# Patient Record
(demographics unavailable — no encounter records)

---

## 2017-02-17 NOTE — REPUSA
CT of the abdomen and pelvis without contrast

Clinical statement: Pain.

Technique: Multiple axial CT images were obtained from the base of the lungs to the floor of the pelv
is utilizing 5 mm axial slices without administration of contrast. Coronal and sagittal reconstructio
ns were also obtained.

No comparison is available.

Findings:

Chest: The visualized lung bases are clear.

Abdomen: The kidneys are normal in size bilaterally. There is moderate right-sided hydronephrosis cau
sed by a 4 mm obstructing stone at the right ureteropelvic junction. The liver, spleen, pancreas, gal
lbladder and adrenal glands are unremarkable. The aorta demonstrates normal caliber and contour. Ther
e is no abdominal lymphadenopathy or ascites.

Pelvis: The bowel is unremarkable, with no obstructive or inflammatory changes. The appendix is simone
l. The urinary bladder is within normal limits. There is no pelvic lymphadenopathy or ascites. The ot
her pelvic structures appear unremarkable.

Bones: There are no suspicious osseous abnormalities seen.

Impression: Mild right-sided hydronephrosis caused by a 4 mm obstructing stone at the right ureterope
lvic junction.

     Electronically signed by JODI CAMARILLO MD on 02/17/2017 10:05:26 PM ET

## 2017-02-17 NOTE — EDDOCDS
Nurse's Notes                                                                                     

[facility]                                                                         

Name: Marjorie Graves                                                                             

Age: 25 yrs                                                                                       

Sex: Female                                                                                       

: 1991                                                                                   

MRN: [id number]                                                                                     

Arrival Date: 2017                                                                          

Time: 19:40                                                                                       

Account#: [id number]                                                                              

Bed I5 / M5                                                                                       

Private MD: Libertad                                                                       

Diagnosis: Hydronephrosis with renal and ureteral calculous obstruction-right 4mm UPJ stone with  

mild hydronephrosis                                                                             

                                                                                                  

Presentation:                                                                                     

                                                                                             

19:50 Presenting complaint: Patient states: stomach pain since earlier today pain back side   cz  

      and worse now pt denies UTI symptoms. Acute neurological deficits are not present.          

      Mechanism of Injury: No Mechanism of Injury. Adult Sepsis Screening: The patient does       

      not have new or worsening altered mentation. Patient's respiratory rate is less than        

      22. Systolic blood pressure is greater than 100. Patient has a qSOFA score of 0-            

      Negative Sepsis Screen. Suicide/Homicide risk assessment- the patient denies having any     

      suicidal and/or homicidal ideations and does not present with any other emotional,          

      behavioral or mental health complaints.  Status: The patient is a           

      dependent. Transition of care: patient was not received from another setting of care.       

19:50 Acuity: TANG Level 3                                                                     cz  

19:50 Method Of Arrival: Walkin/Carried/Asstd                                                 cz  

                                                                                                  

Triage Assessment:                                                                                

19:52 General: Appears uncomfortable. Pain: Location: back Pain currently is 8 out of 10 on a cz  

      pain scale. Pt Declines HIV testing.                                                        

                                                                                                  

OB/GYN:                                                                                           

19:52 no periods due to birth control pill                                                    cz  

                                                                                                  

Historical:                                                                                       

- Allergies: No known drug Allergies;                                                             

- Home Meds:                                                                                      

1. birth control                                                                                

- PMHx: none;                                                                                     

- PSHx: ;                                                                                

- Social history: Smoking status: Patient states was never smoker of tobacco. No                  

barriers to communication noted, The patient speaks fluent English, Speaks                      

appropriately for age.                                                                          

- Family history: Not pertinent.                                                                  

- : The pt / caregiver states he / she is not on anticoagulants. Home medication list             

is obtained from the patient.                                                                   

- Exposure Risk Screening:: None identified.                                                      

                                                                                                  

                                                                                                  

Screenin:29 Screening information is obtained from the patient. Fall risk: No risks identified.     cjh 

      Assistance ADL's: requires no assistance with activities of daily living. Abuse/DV          

      Screen: The patient / caregiver reports he/she is: not in a situation that causes fear,     

      pain or injury. Nutritional screening: No deficits noted. Advance Directives: There is      

      no active DNR order. home support is adequate.                                              

                                                                                                  

Assessment:                                                                                       

20:29 General: Appears in no apparent distress, comfortable, Behavior is appropriate for age, cjh 

      cooperative. Pain: Location: posterior aspect of right lateral abdomen and anterior         

      aspect of right lateral abdomen Pain currently is 8 out of 10 on a pain scale.              

      Respiratory: Airway is patent Respiratory effort is even, unlabored, Respiratory            

      pattern is regular, symmetrical. GI: Abdomen is non- distended Bowel sounds present X 4     

      quads. Abd is soft and non tender X 4 quads. Musculoskeletal: Range of motion intact in     

      all extremities.                                                                            

21:10 General: Pt laying in bed talking on phone. States "I feel so much better". Bolus       ld5 

      started per orders. Will continue to monitor.                                               

21:53 General: returned from CT, tolerated well, awaiting results, friends at bedside, will   [facility] 

      continue to monitor.                                                                        

22:36 General: Pt ambulated to bathroom. Tolerated well. Reports minimal pain. Resting        ld5 

      comfortably in bed. Friends at bedside. Will continue to monitor.                           

23:05 General: Appears in no apparent distress, Behavior is cooperative. Pain: Pain currently ld5 

      is 2 out of 10 on a pain scale. Neurological: Level of Consciousness is awake, alert.       

      Respiratory: Airway is patent Respiratory effort is even, unlabored.                        

                                                                                                  

Vital Signs:                                                                                      

19:42  / 71; Pulse 72; Resp 18 S; Temp 97.9(O); Pulse Ox 99% on R/A; Weight 53.52 kg    gr2 

      (R); Height 4 ft. 11 in. (149.86 cm) (R); Pain 9/10;                                        

23:05  / 71; Pulse 74; Resp 16; Temp 97.8; Pulse Ox 100% on R/A; Pain 2/10;             ld5 

19:42 Body Mass Index 23.83 (53.52 kg, 149.86 cm)                                             gr2 

                                                                                                  

Vitals:                                                                                           

19:42 Log In Time: 2017 at 19:42.                                                gr2 

                                                                                                  

ED Course:                                                                                        

19:42 Patient visited by Kavon Scott.                                                   gr2 

19:42 Libertad is Private Physician.                                                   gr2 

19:42 Patient moved to Waiting                                                                gr2 

19:44 Patient visited by Kavon Scott.                                                   gr2 

19:44 Patient moved to Pre RCE                                                                gr2 

19:51 Triage Initiated                                                                        cz  

19:55 Reji Rudolph PA-C is PsychiatricP.                                                        ar2 

19:55 Patient moved to Triage 2                                                               cz  

19:56 David Rhoades DO is Attending Physician.                                            ar2 

19:56 Patient visited by Reji Rudolph PA-C.                                             ar2 

20:06 Patient moved to I5 / M5                                                                cz  

20:16 Patient visited by Joan Nichols PCA.                                               cln 

20:16 Urinalysis Sent.                                                                        cln 

20:16 Urine Culture Sent.                                                                     cln 

20:29 The patient / caregiver is instructed regarding the plan of care and ED course.         [facility] 

20:29 Inserted saline lock: 20 gauge in left antecubital area and blood collected. No         [facility] 

      procedures done that require assistance. Labs drawn. (by ED staff). Sent per order to       

      lab.                                                                                        

20:48 Patient visited by Itzel Mayen RN.                                                  ld5 

21:11 Patient visited by Itzel Mayen RN.                                                  ld5 

21:54 Patient visited by Anuradha Whipple RN.                                                      cjh 

22:12 CT ABD & PELVIS: No Contrast Returned.                                                  EDMS

22:25 [facility] Payment Agreement was scanned into [employer] and attached to record.               zo  

22:37 Patient visited by Itzel Mayen RN.                                                  ld5 

22:43 Jazmin Adkins MD is Referral Physician.                                              ar2 

23:05 Discontinued lock intact, bleeding controlled, pressure dressing applied, No            ld5 

      redness/swelling at site.                                                                   

23:07 Patient visited by Itzel Mayen RN.                                                  ld5 

                                                                                                  

Administered Medications:                                                                         

20:24 Drug: Ondansetron 4 mg [ondansetron HCl 2 mg/mL intravenous solution (2 mL)] Route:     ld5 

      IVP; Site: left antecubital;                                                                

23:05 Follow up: Response: Nausea is decreased                                                ld5 

20:48 Drug: ketorolac 30 mg [ketorolac 30 mg/mL (1 mL) injection solution (1 mL)] Route: IVP; ld5 

      Site: left antecubital;                                                                     

23:00 Follow up: Response: Med's dispensed home                                               ld5 

21:11 Drug: NS 0.9% 1000 ml [sodium chloride 0.9 % intravenous solution] Route: IV; Rate:     ld5 

      bolus; Site: left antecubital;                                                              

22:59 Follow up: IV Status: Completed infusion; IV Intake: 1000ml                             ld5 

22:59 Drug: HYDROcodone-acetaminophen 4 pack- 1 packets [hydrocodone 5 mg-acetaminophen 325   ld5 

      mg tablet (1 tabs)] {Co-Signature: [facility] (Anuradha Whipple RN).} Route: PO;                        

22:59 Follow up: Response: Med's dispensed home                                               ld5 

                                                                                                  

                                                                                                  

Point of Care Testing:                                                                            

      Urine Pregnancy:                                                                            

20:16 hCG Reading: Negative; Control Reading: Positive;                                       cln 

      Ranges:                                                                                     

                                                                                                  

Intake:                                                                                           

22:59 IV: 1000.00ml; Total: 1000.00ml.                                                        ld5 

                                                                                                  

Order Results:                                                                                    

Lab Order: Urinalysis; SPEC'M 17 19:58                                                      

      Test: APPEARANCE, URINE; Value: CLEAR; Range: CLEAR; Status: F                              

      Test: COLOR, URINE; Value: YELLOW; Range: YELLOW; Status: F                                 

      Test: PH,URINE; Value: 8.0; Range: 5.0-9.0; Units: UNITS; Status: F                         

      Test: SPECIFIC GRAVITY URINE AUTO; Value: 1.018; Range: 1.002-1.035; Status: F              

      Test: PROTEIN, URINE AUTO; Value: 1+; Range: NEGATIVE; Abnormal: Above high normal;         

      Units: mg/dL; Status: F                                                                     

      Test: GLUCOSE, URINE (UA) AUTO; Value: NEGATIVE; Range: NEGATIVE; Units: mg/dL; Status:     

      F                                                                                           

      Test: KETONE, URINE AUTO; Value: NEGATIVE; Range: NEGATIVE; Units: mg/dL; Status: F         

      Test: UROBILINOGEN, URINE AUTO; Value: 0.2; Range: 0.0-2.0; Units: mg/dL; Status: F         

      Test: BILIRUBIN, URINE AUTO; Value: NEGATIVE; Range: NEGATIVE; Status: F                    

      Test: NITRITE, URINE AUTO; Value: NEGATIVE; Range: NEGATIVE; Status: F                      

      Test: LEUKOCYTE ESTERASE, URINE AUTO; Value: TRACE; Range: NEGATIVE; Abnormal: Above        

      high normal; Status: F                                                                      

      Test: BLOOD, URINE BLOOD; Value: 3+; Range: NEGATIVE; Abnormal: Above high normal;          

      Status: F                                                                                   

      Test: WBC, URINE AUTO; Value: 13; Range: 0-3; Abnormal: Above high normal; Units: /HPF;     

      Status: F                                                                                   

      Test: RBC, URINE AUTO; Value: TNTC; Range: 0-3; Abnormal: Above high normal; Units:         

      /HPF; Status: F                                                                             

      Test: BACTERIA, URINE AUTO; Value: 1+; Range: NEGATIVE; Abnormal: Above high normal;        

      Status: F                                                                                   

      Test: SQUAMOUS EPITHELIAL CELL UR AU; Value: 1; Range: 0-6; Units: /HPF; Status: F          

      Test: MUCUS, URINE; Value: SMALL; Range: NEGATIVE; Status: F                                

      Test: HYALINE CAST, URINE AUTO; Value: 0; Range: 0-1; Units: /LPF; Status: F                

      Test: AMORPHOUS SEDIMENT; Value: SMALL; Range: NEGATIVE; Abnormal: Above high normal;       

      Status: F                                                                                   

Lab Order: Amylase; SPEC'M 17 20:19                                                         

      Test: AMYLASE; Value: 65; Range: ; Units: U/L; Status: F                              

Lab Order: Basic Metabolic Profile; SPEC' 17 20:19                                         

      Test: GLUCOSE, FASTING; Value: 87; Range: ; Units: MG/DL; Status: F                   

      Test: BLOOD UREA NITROGEN; Value: 8; Range: 7-18; Units: MG/DL; Status: F                   

      Test: CREATININE FOR GFR; Value: 0.92; Range: 0.55-1.02; Units: MG/DL; Status: F            

      Test: GLOMERULAR FILTRATION RATE; Value: > 60.0; Range: >60; Status: F                      

      Test: SODIUM LEVEL; Value: 142; Range: 136-145; Units: MEQ/L; Status: F                     

      Test: POTASSIUM SERUM; Value: 3.8; Range: 3.5-5.1; Units: MEQ/L; Status: F                  

      Test: CHLORIDE LEVEL; Value: 106; Range: ; Units: MEQ/L; Status: F                    

      Test: CARBON DIOXIDE LEVEL; Value: 28; Range: 21-32; Units: MEQ/L; Status: F                

      Test: ANION GAP; Value: 8; Range: 8-16; Units: MEQ/L; Status: F                             

      Test: CALCIUM LEVEL; Value: 9.0; Range: 8.5-10.1; Units: MG/DL; Status: F                   

      Test Note: &nbsp;; Units are mL/min/1.73 m2 Chronic Kidney Disease Staging per NKF:       

      Stage I & II GFR >=60 Normal to Mildly Decreased Stage III GFR 30-59 Moderately           

      Decreased Stage IV GFR 15-29 Severely Decreased Stage V GFR <15 Very Little GFR Left        

      ESRD GFR <15 on RRT                                                                         

Lab Order: CBC with Diff; SPEC'M 17 20:19                                                   

      Test: WHITE BLOOD COUNT; Value: 8.6; Range: 4.0-10.0; Units: K/mm3; Status: F               

      Test: RED BLOOD COUNT; Value: 4.67; Range: 4.00-5.40; Units: M/mm3; Status: F               

      Test: HEMOGLOBIN; Value: 13.8; Range: 12.0-16.0; Units: g/dl; Status: F                     

      Test: HEMATOCRIT; Value: 42.3; Range: 36.0-47.0; Units: %; Status: F                        

      Test: MEAN CORPUSCULAR VOLUME; Value: 90.5; Range: 80.0-96.0; Units: fl; Status: F          

      Test: MEAN CORPUSCULAR HEMOGLOBIN; Value: 29.6; Range: 27.0-33.0; Units: pg; Status: F      

      Test: MEAN CORPUSCULAR HGB CONC; Value: 32.8; Range: 32.0-36.5; Units: g/dl; Status: F      

      Test: RED CELL DISTRIBUTION WIDTH; Value: 13.2; Range: 11.5-14.5; Units: %; Status: F       

      Test: PLATELET COUNT, AUTOMATED; Value: 208; Range: 150-450; Units: k/mm3; Status: F        

      Test: NEUTROPHILS %; Value: 61.4; Range: 36.0-66.0; Units: %; Status: F                     

      Test: LYMPH %; Value: 28.8; Range: 24.0-44.0; Units: %; Status: F                           

      Test: MONO %; Value: 5.1; Range: 0.0-5.0; Abnormal: Above high normal; Units: %;            

      Status: F                                                                                   

      Test: EOS %; Value: 1.0; Range: 0.0-3.0; Units: %; Status: F                                

      Test: BASO %; Value: 0.5; Range: 0.0-1.0; Units: %; Status: F                               

      Test: LARGE UNSTAINED CELL %; Value: 3.0; Range: 0.0-4.0; Units: %; Status: F               

      Test: NEUTROPHILS #; Value: 5.3; Range: 1.8-7.7; Units: K/mm3; Status: F                    

      Test: LYMPH #; Value: 2.5; Range: 1.5-6.5; Units: K/mm3; Status: F                          

      Test: MONO #; Value: 0.4; Range: 0.0-0.8; Units: K/mm3; Status: F                           

      Test: EOS #; Value: 0.1; Range: 0.0-0.50; Units: K/mm3; Status: F                           

      Test: BASO #; Value: 0.0; Range: 0.0-0.2; Units: K/mm3; Status: F                           

      Test: LARGE UNSTAINED CELL #; Value: 0.3; Range: 0.0-0.4; Units: K/mm3; Status: F           

Lab Order: Lipase; SPEC' 17 20:19                                                          

      Test: LIPASE; Value: 96; Range: ; Units: U/L; Status: F                               

Lab Order: Liver Profile; SPEC' 17 20:19                                                   

      Test: AST/SGOT; Value: 15; Range: 15-37; Units: U/L; Status: F                              

      Test: ALT/SGPT; Value: 20; Range: 12-78; Units: U/L; Status: F                              

      Test: ALKALINE PHOSPHATASE; Value: 66; Range: ; Units: U/L; Status: F                 

      Test: BILIRUBIN,TOTAL; Value: 0.2; Range: 0.2-1.0; Units: MG/DL; Status: F                  

      Test: BILIRUBIN,DIRECT; Value: < 0.1; Range: 0.0-0.2; Units: MG/DL; Status: F               

      Test: TOTAL PROTEIN; Value: 6.8; Range: 6.4-8.2; Units: GM/DL; Status: F                    

      Test: ALBUMIN; Value: 3.5; Range: 3.2-5.2; Units: GM/DL; Status: F                          

      Test: ALBUMIN/GLOBULIN RATIO; Value: 1.06; Range: 1.00-1.93; Status: F                      

                                                                                                  

Radiology Order: CT ABD & PELVIS: No Contrast                                                   

      Test: CT ABD & PELVIS: No Contrast                                                        

      REASON FOR EXAMINATION: right renal colic; ; CT of the abdomen and pelvis without           

      contrast; Clinical statement: Pain.; Technique: Multiple axial CT images were obtained      

      from the base of the lungs to the floor of the pelv; is utilizing 5 mm axial slices         

      without administration of contrast. Coronal and sagittal reconstructio; ns were also        

      obtained.; No comparison is available.; Findings:; Chest: The visualized lung bases are     

      clear.; Abdomen: The kidneys are normal in size bilaterally. There is moderate              

      right-sided hydronephrosis cau; sed by a 4 mm obstructing stone at the right                

      ureteropelvic junction. The liver, spleen, pancreas, gal; lbladder and adrenal glands       

      are unremarkable. The aorta demonstrates normal caliber and contour. Ther; e is no          

      abdominal lymphadenopathy or ascites.; Pelvis: The bowel is unremarkable, with no           

      obstructive or inflammatory changes. The appendix is simone; l. The urinary bladder is       

      within normal limits. There is no pelvic lymphadenopathy or ascites. The ot; her pelvic     

      structures appear unremarkable.; Bones: There are no suspicious osseous abnormalities       

      seen.; Impression: Mild right-sided hydronephrosis caused by a 4 mm obstructing stone       

      at the right ureterope; lvic junction.; Electronically signed by JODI CAMARILLO MD on     

      2017 10:05:26 PM ET;                                                                  

Outcome:                                                                                          

22:44 Discharge ordered by Provider.                                                          ar2 

23:05 Discharge Assessment: Patient awake, alert and oriented x 3. No cognitive and/or        ld5 

      functional deficits noted. Patient verbalized understanding of disposition                  

      instructions. patient administered narcotics - no. The following High Risk Discharge        

      criteria are identified: None. Discharged to home ambulatory, with friend. Condition:       

      stable. Discharge instructions given to patient, Instructed on discharge instructions,      

      follow up and referral plans. medication usage, no driving heavy equipment,                 

      Demonstrated understanding of instructions, medications, Pt was receptive of discharge      

      instructions/ teaching. Prescriptions given X 4. CT Study completed. Property :Personal     

      belongings accompany Pt.                                                                    

23:07 Patient left the ED.                                                                    ld5 

                                                                                                  

Signatures:                                                                                       

Dispatcher [employer]                           EDMS                                                 

Juan C Carr, Alvino Smalls RN, Aaron, MIA BELLAMY ar2                                                  

Itzel Mayen RN RN ld5                                                  

Anuradha Whipple,FANTASMA                          RN   [facility]                                                  

Kavon Scott                            gr2                                                  

Joan Nichols, PCA                   PCA  cln                                                  

Anuradha Whipple RN                               [facility]                                                  

                                                                                                  

**************************************************************************************************

MTDD

## 2017-02-17 NOTE — EDDOCDS
Physician Documentation                                                                           

[facility]                                                                         

Name: Marjorie Graves                                                                             

Age: 25 yrs                                                                                       

Sex: Female                                                                                       

: 1991                                                                                   

MRN: [id number]                                                                                     

Arrival Date: 2017                                                                          

Time: 19:40                                                                                       

Account#: [id number]                                                                              

Bed I5 / M5                                                                                       

Private MD: Libertad                                                                       

Disposition:                                                                                      

17 22:44 Discharged to Home/Self Care. Impression: Hydronephrosis with renal and            

ureteral calculous obstruction - right 4mm UPJ stone with mild hydronephrosis.                  

- Condition is Stable.                                                                            

- Discharge Instructions: Kidney Stones.                                                          

- Prescriptions for Ibuprofen 800 mg Oral Tablet - take 1 tablet by ORAL route every 8            

hours As needed take with food; 30 tablet. Norco 5- 325 mg Oral Tablet - take 1                 

tablet by ORAL route every 6 hours As needed MDD: 4 tabs; 16 tablet. Flomax 0.4 mg              

Oral Capsule, Sust. Release 24 hr - take 1 capsule by ORAL route once daily 1/2 hour            

following the same meal each day; 15 capsule. ZOFRAN ODT 4 mg Oral - dissolve 1                 

tablet by ORAL route every 8 hours As needed do not chew, do not swallow whole; 10              

tablet.                                                                                         

- Medication Reconciliation, Local Pharmacy Hours form.                                           

- Follow up: Jazmin Adkins MD; When: Call to arrange an appointment; Reason: Recheck           

today's complaints, Continuance of care. Follow up: Emergency Department; When: As              

needed; Reason: Worsening of conditions.                                                        

- Problem is new.                                                                                 

- Symptoms have improved.                                                                         

- Notes: call monday for follow up appointment. drink plenty of fluids                            

                                                                                                  

                                                                                                  

Historical:                                                                                       

- Allergies: No known drug Allergies;                                                             

- Home Meds:                                                                                      

1. birth control                                                                                

- PMHx: none;                                                                                     

- PSHx: ;                                                                                

- Social history: Smoking status: Patient states was never smoker of tobacco. No                  

barriers to communication noted, The patient speaks fluent English, Speaks                      

appropriately for age.                                                                          

- Family history: Not pertinent.                                                                  

- : The pt / caregiver states he / she is not on anticoagulants. Home medication list             

is obtained from the patient.                                                                   

- Exposure Risk Screening:: None identified.                                                      

                                                                                                  

                                                                                                  

OB/GYN:                                                                                           

                                                                                             

19:52 no periods due to birth control pill                                                    cz  

                                                                                                  

Vital Signs:                                                                                      

19:42  / 71; Pulse 72; Resp 18 S; Temp 97.9(O); Pulse Ox 99% on R/A; Weight 53.52 kg /  gr2 

      117.99 lbs (R); Height 4 ft. 11 in. (149.86 cm) (R); Pain 9/10;                             

23:05  / 71; Pulse 74; Resp 16; Temp 97.8; Pulse Ox 100% on R/A; Pain 2/10;             ld5 

19:42 Body Mass Index 23.83 (53.52 kg, 149.86 cm)                                             gr2 

                                                                                                  

MDM:                                                                                              

19:57 UCG by Nursing ordered.                                                                 cz  

19:58 Urinalysis Ordered.                                                                     EDMS

19:58 Urine Culture Ordered.                                                                  EDMS

20:03 Undress patient appropriately for examination ordered.                                  ar2 

20:03 IV Saline Lock ordered.                                                                 ar2 

20:03 Ondansetron 4 mg IVP once ordered.                                                      ar2 

20:05 Amylase Ordered.                                                                        EDMS

20:05 Basic Metabolic Profile Ordered.                                                        EDMS

20:05 CBC with Diff Ordered.                                                                  EDMS

20:05 Lipase Ordered.                                                                         EDMS

20:05 Liver Profile Ordered.                                                                  EDMS

20:06 NOTHING BY MOUTH+DIET ordered.                                                          EDMS

20:42 ketorolac 30 mg IVP once ordered.                                                       ar2 

20:52 Urinalysis Reviewed.                                                                    ar2 

20:52 CBC with Diff Reviewed.                                                                 ar2 

20:52 Amylase Reviewed.                                                                       ar2 

20:52 Basic Metabolic Profile Reviewed.                                                       ar2 

20:52 Lipase Reviewed.                                                                        ar2 

20:52 Liver Profile Reviewed.                                                                 ar2 

20:53 NS 0.9% 1000 ml IV at bolus once ordered.                                               ar2 

20:53 CT ABD & PELVIS: No Contrast Ordered.                                                   EDMS

21:48 Financial registration complete.                                                        zo  

22:25 [facility] Payment Agreement was scanned into [employer] and attached to record.               zo  

22:42 Dispense Urine Strainer ordered.                                                        ar2 

22:47 HYDROcodone-acetaminophen 4 pack- 5 mg-325 mg 1 packets PO Per package directions;      ar2 

      Dispense with patient. 1 po q4h prn for pain ordered.                                       

22:48 CT ABD & PELVIS: No Contrast Reviewed.                                                  ar2

                                                                                                  

Point of Care Testing:                                                                            

      Urine Pregnancy:                                                                            

20:16 hCG Reading: Negative; Control Reading: Positive;                                       cln 

      Ranges:                                                                                     

                                                                                                  

Administered Medications:                                                                         

20:24 Drug: Ondansetron 4 mg [ondansetron HCl 2 mg/mL intravenous solution (2 mL)] Route:     ld5 

      IVP; Site: left antecubital;                                                                

23:05 Follow up: Response: Nausea is decreased                                                ld5 

20:48 Drug: ketorolac 30 mg [ketorolac 30 mg/mL (1 mL) injection solution (1 mL)] Route: IVP; ld5 

      Site: left antecubital;                                                                     

23:00 Follow up: Response: Med's dispensed home                                               ld5 

21:11 Drug: NS 0.9% 1000 ml [sodium chloride 0.9 % intravenous solution] Route: IV; Rate:     ld5 

      bolus; Site: left antecubital;                                                              

22:59 Follow up: IV Status: Completed infusion; IV Intake: 1000ml                             ld5 

22:59 Drug: HYDROcodone-acetaminophen 4 pack- 1 packets [hydrocodone 5 mg-acetaminophen 325   ld5 

      mg tablet (1 tabs)] {Co-Signature: [facility] (Anuradha Whipple RN).} Route: PO;                        

22:59 Follow up: Response: Med's dispensed home                                               ld5 

                                                                                                  

                                                                                                  

Signatures:                                                                                       

Dispatcher [employer]                           Juan C Arteaga RN RN cz Olin, Zoeann zo Robertshaw, Aaron, PA-C                 [facility] ar2                                                  

Itzel Mayen RN RN ld5 Hafner, Jane, RN RN   [facility]                                                  

Anuradha Whipple RN                               [facility]                                                  

                                                                                                  

The chart was reviewed and I authenticate all verbal orders and agree with the evaluation and 
treatment provided.Attachments:

22:25 [facility] Payment Agreement                                                                zo  

                                                                                                  

**************************************************************************************************

MTDD

## 2017-02-20 NOTE — EDDOCDS
Nurse's Notes                                                                                     

[facility]                                                                         

Name: Marjorie Graves                                                                             

Age: 25 yrs                                                                                       

Sex: Female                                                                                       

: 1991                                                                                   

MRN: [id number]                                                                                     

Arrival Date: 2017                                                                          

Time: 19:40                                                                                       

Account#: [id number]                                                                              

Bed I5 / M5                                                                                       

Private MD: Libertad                                                                       

Diagnosis: Hydronephrosis with renal and ureteral calculous obstruction-right 4mm UPJ stone with  

mild hydronephrosis                                                                             

                                                                                                  

Presentation:                                                                                     

                                                                                             

19:50 Presenting complaint: Patient states: stomach pain since earlier today pain back side   cz  

      and worse now pt denies UTI symptoms. Acute neurological deficits are not present.          

      Mechanism of Injury: No Mechanism of Injury. Adult Sepsis Screening: The patient does       

      not have new or worsening altered mentation. Patient's respiratory rate is less than        

      22. Systolic blood pressure is greater than 100. Patient has a qSOFA score of 0-            

      Negative Sepsis Screen. Suicide/Homicide risk assessment- the patient denies having any     

      suicidal and/or homicidal ideations and does not present with any other emotional,          

      behavioral or mental health complaints.  Status: The patient is a           

      dependent. Transition of care: patient was not received from another setting of care.       

19:50 Acuity: TANG Level 3                                                                     cz  

19:50 Method Of Arrival: Walkin/Carried/Asstd                                                 cz  

                                                                                                  

Triage Assessment:                                                                                

19:52 General: Appears uncomfortable. Pain: Location: back Pain currently is 8 out of 10 on a cz  

      pain scale. Pt Declines HIV testing.                                                        

                                                                                                  

OB/GYN:                                                                                           

19:52 no periods due to birth control pill                                                    cz  

                                                                                                  

Historical:                                                                                       

- Allergies: No known drug Allergies;                                                             

- Home Meds:                                                                                      

1. birth control                                                                                

- PMHx: none;                                                                                     

- PSHx: ;                                                                                

- Social history: Smoking status: Patient states was never smoker of tobacco. No                  

barriers to communication noted, The patient speaks fluent English, Speaks                      

appropriately for age.                                                                          

- Family history: Not pertinent.                                                                  

- : The pt / caregiver states he / she is not on anticoagulants. Home medication list             

is obtained from the patient.                                                                   

- Exposure Risk Screening:: None identified.                                                      

                                                                                                  

                                                                                                  

Screenin:29 Screening information is obtained from the patient. Fall risk: No risks identified.     cjh 

      Assistance ADL's: requires no assistance with activities of daily living. Abuse/DV          

      Screen: The patient / caregiver reports he/she is: not in a situation that causes fear,     

      pain or injury. Nutritional screening: No deficits noted. Advance Directives: There is      

      no active DNR order. home support is adequate.                                              

                                                                                                  

Assessment:                                                                                       

20:29 General: Appears in no apparent distress, comfortable, Behavior is appropriate for age, cjh 

      cooperative. Pain: Location: posterior aspect of right lateral abdomen and anterior         

      aspect of right lateral abdomen Pain currently is 8 out of 10 on a pain scale.              

      Respiratory: Airway is patent Respiratory effort is even, unlabored, Respiratory            

      pattern is regular, symmetrical. GI: Abdomen is non- distended Bowel sounds present X 4     

      quads. Abd is soft and non tender X 4 quads. Musculoskeletal: Range of motion intact in     

      all extremities.                                                                            

21:10 General: Pt laying in bed talking on phone. States "I feel so much better". Bolus       ld5 

      started per orders. Will continue to monitor.                                               

21:53 General: returned from CT, tolerated well, awaiting results, friends at bedside, will   [facility] 

      continue to monitor.                                                                        

22:36 General: Pt ambulated to bathroom. Tolerated well. Reports minimal pain. Resting        ld5 

      comfortably in bed. Friends at bedside. Will continue to monitor.                           

23:05 General: Appears in no apparent distress, Behavior is cooperative. Pain: Pain currently ld5 

      is 2 out of 10 on a pain scale. Neurological: Level of Consciousness is awake, alert.       

      Respiratory: Airway is patent Respiratory effort is even, unlabored.                        

                                                                                                  

Vital Signs:                                                                                      

19:42  / 71; Pulse 72; Resp 18 S; Temp 97.9(O); Pulse Ox 99% on R/A; Weight 53.52 kg    gr2 

      (R); Height 4 ft. 11 in. (149.86 cm) (R); Pain 9/10;                                        

23:05  / 71; Pulse 74; Resp 16; Temp 97.8; Pulse Ox 100% on R/A; Pain 2/10;             ld5 

19:42 Body Mass Index 23.83 (53.52 kg, 149.86 cm)                                             gr2 

                                                                                                  

Vitals:                                                                                           

19:42 Log In Time: 2017 at 19:42.                                                gr2 

                                                                                                  

ED Course:                                                                                        

19:42 Patient visited by Kavon Scott.                                                   gr2 

19:42 Libertad is Private Physician.                                                   gr2 

19:42 Patient moved to Waiting                                                                gr2 

19:44 Patient visited by Kavon Scott.                                                   gr2 

19:44 Patient moved to Pre RCE                                                                gr2 

19:51 Triage Initiated                                                                        cz  

19:55 [facility]                                                        ar2 

19:55 Patient moved to Triage 2                                                               cz  

19:56 David Rhoades DO is Attending Physician.                                            ar2 

19:56 Patient visited by Reji Rudolph PA-C.                                             ar2 

20:06 Patient moved to I5 / M5                                                                cz  

20:16 Patient visited by Joan Nichols PCA.                                               cln 

20:16 Urinalysis Sent.                                                                        cln 

20:16 Urine Culture Sent.                                                                     cln 

20:29 The patient / caregiver is instructed regarding the plan of care and ED course.         [facility] 

20:29 Inserted saline lock: 20 gauge in left antecubital area and blood collected. No         [facility] 

      procedures done that require assistance. Labs drawn. (by ED staff). Sent per order to       

      lab.                                                                                        

20:48 Patient visited by Itzel Mayen RN.                                                  ld5 

21:11 Patient visited by Itzel Mayen RN.                                                  ld5 

21:54 Patient visited by Anuradha Whipple RN.                                                      cjh 

22:12 CT ABD & PELVIS: No Contrast Returned.                                                  EDMS

22:25 [facility] Payment Agreement was scanned into [employer] and attached to record.               zo  

22:37 Patient visited by Itzel Mayen RN.                                                  ld5 

22:43 Jazmin Adkins MD is Referral Physician.                                              ar2 

23:05 Discontinued lock intact, bleeding controlled, pressure dressing applied, No            ld5 

      redness/swelling at site.                                                                   

23:07 Patient visited by Itzel Mayen RN.                                                  ld5 

                                                                                             

11:30 T-Sheet-- Draft Copy was scanned into [employer] and attached to record.                   gb  

11:30 Radiology Report was scanned into [employer] and attached to record.                       gb  

                                                                                                  

Administered Medications:                                                                         

                                                                                             

20:24 Drug: Ondansetron 4 mg [ondansetron HCl 2 mg/mL intravenous solution (2 mL)] Route:     ld5 

      IVP; Site: left antecubital;                                                                

23:05 Follow up: Response: Nausea is decreased                                                ld5 

20:48 Drug: ketorolac 30 mg [ketorolac 30 mg/mL (1 mL) injection solution (1 mL)] Route: IVP; ld5 

      Site: left antecubital;                                                                     

23:00 Follow up: Response: Med's dispensed home                                               ld5 

21:11 Drug: NS 0.9% 1000 ml [sodium chloride 0.9 % intravenous solution] Route: IV; Rate:     ld5 

      bolus; Site: left antecubital;                                                              

22:59 Follow up: IV Status: Completed infusion; IV Intake: 1000ml                             ld5 

22:59 Drug: HYDROcodone-acetaminophen 4 pack- 1 packets [hydrocodone 5 mg-acetaminophen 325   ld5 

      mg tablet (1 tabs)] {Co-Signature: [facility] (Anuradha Whipple RN).} Route: PO;                        

22:59 Follow up: Response: Med's dispensed home                                               ld5 

                                                                                                  

                                                                                                  

Point of Care Testing:                                                                            

      Urine Pregnancy:                                                                            

20:16 hCG Reading: Negative; Control Reading: Positive;                                       cln 

      Ranges:                                                                                     

                                                                                                  

Intake:                                                                                           

22:59 IV: 1000.00ml; Total: 1000.00ml.                                                        ld5 

                                                                                                  

Order Results:                                                                                    

Lab Order: Urine Culture; SPEC'M 17 19:58                                                   

      Test: URINE CULTURE; Value: <EXTERNAL COMMENT eCWMed> FULL REPORT IN LAB NOTES (eCW and     

      Medent).; Status: F                                                                         

      Test: URINE CULTURE; Value: ORGANISM 1: PROTEUS MIRABILIS; Status: F                        

      Test: URINE CULTURE; Value: PROTEUS MIRABILIS; Status: F                                    

      Test: URINE CULTURE; Value: COLONY COUNT CFU/ml >100,000; Status: F                         

      Test: URINE CULTURE; Value: GRAM NEG SENSI - VITEK 80; Status: F                            

      Test: URINE CULTURE; Value: Method: VIT2; Status: F                                         

      Test: URINE CULTURE; Value: TRIMETHOPRIM/SULFAMETHOXAZOLE >=320 R; Status: F                

      Test: URINE CULTURE; Value: AMPICILLIN <=2 S; Status: F                                     

      Test: URINE CULTURE; Value: GENTAMICIN <=1 S; Status: F                                     

      Test: URINE CULTURE; Value: NITROFURANTOIN 128 R; Status: F                                 

      Test: URINE CULTURE; Value: CEFAZOLIN <=4 S; Status: F                                      

      Test: URINE CULTURE; Value: LEVOFLOXACIN <=0.12 S; Status: F                                

      Test: URINE CULTURE; Value: TOBRAMYCIN <=1 S; Status: F                                     

      Test: URINE CULTURE; Value: CEFTRIAXONE <=1 S; Status: F                                    

      Test: URINE CULTURE; Value: CEFTAZIDIME <=1 S; Status: F                                    

      Test: URINE CULTURE; Value: AMPICILLIN/SULBACTAM <=2 S; Status: F                           

      Test: URINE CULTURE; Value: PIPERACILLIN/TAZOBACTAM <=4 S; Status: F                        

      Test: URINE CULTURE; Value: AZTREONAM <=1 S; Status: F                                      

      Test: URINE CULTURE; Value: ERTAPENEM <=0.5 S; Status: F                                    

      Test: URINE CULTURE; Value: MEROPENEM <=0.25 S; Status: F                                   

      Test: URINE CULTURE; Value: TIGECYCLINE 4 R; Status: F                                      

      Test: URINE CULTURE; Value: CEFEPIME <=1 S; Status: F                                       

Lab Order: Urinalysis; SPEC'M 17 19:58                                                      

      Test: APPEARANCE, URINE; Value: CLEAR; Range: CLEAR; Status: F                              

      Test: COLOR, URINE; Value: YELLOW; Range: YELLOW; Status: F                                 

      Test: PH,URINE; Value: 8.0; Range: 5.0-9.0; Units: UNITS; Status: F                         

      Test: SPECIFIC GRAVITY URINE AUTO; Value: 1.018; Range: 1.002-1.035; Status: F              

      Test: PROTEIN, URINE AUTO; Value: 1+; Range: NEGATIVE; Abnormal: Above high normal;         

      Units: mg/dL; Status: F                                                                     

      Test: GLUCOSE, URINE (UA) AUTO; Value: NEGATIVE; Range: NEGATIVE; Units: mg/dL; Status:     

      F                                                                                           

      Test: KETONE, URINE AUTO; Value: NEGATIVE; Range: NEGATIVE; Units: mg/dL; Status: F         

      Test: UROBILINOGEN, URINE AUTO; Value: 0.2; Range: 0.0-2.0; Units: mg/dL; Status: F         

      Test: BILIRUBIN, URINE AUTO; Value: NEGATIVE; Range: NEGATIVE; Status: F                    

      Test: NITRITE, URINE AUTO; Value: NEGATIVE; Range: NEGATIVE; Status: F                      

      Test: LEUKOCYTE ESTERASE, URINE AUTO; Value: TRACE; Range: NEGATIVE; Abnormal: Above        

      high normal; Status: F                                                                      

      Test: BLOOD, URINE BLOOD; Value: 3+; Range: NEGATIVE; Abnormal: Above high normal;          

      Status: F                                                                                   

      Test: WBC, URINE AUTO; Value: 13; Range: 0-3; Abnormal: Above high normal; Units: /HPF;     

      Status: F                                                                                   

      Test: RBC, URINE AUTO; Value: TNTC; Range: 0-3; Abnormal: Above high normal; Units:         

      /HPF; Status: F                                                                             

      Test: BACTERIA, URINE AUTO; Value: 1+; Range: NEGATIVE; Abnormal: Above high normal;        

      Status: F                                                                                   

      Test: SQUAMOUS EPITHELIAL CELL UR AU; Value: 1; Range: 0-6; Units: /HPF; Status: F          

      Test: MUCUS, URINE; Value: SMALL; Range: NEGATIVE; Status: F                                

      Test: HYALINE CAST, URINE AUTO; Value: 0; Range: 0-1; Units: /LPF; Status: F                

      Test: AMORPHOUS SEDIMENT; Value: SMALL; Range: NEGATIVE; Abnormal: Above high normal;       

      Status: F                                                                                   

Lab Order: Amylase; SPEC'17 20:19                                                         

      Test: AMYLASE; Value: 65; Range: ; Units: U/L; Status: F                              

Lab Order: Basic Metabolic Profile; SPEC'17 20:19                                         

      Test: GLUCOSE, FASTING; Value: 87; Range: ; Units: MG/DL; Status: F                   

      Test: BLOOD UREA NITROGEN; Value: 8; Range: 7-18; Units: MG/DL; Status: F                   

      Test: CREATININE FOR GFR; Value: 0.92; Range: 0.55-1.02; Units: MG/DL; Status: F            

      Test: GLOMERULAR FILTRATION RATE; Value: > 60.0; Range: >60; Status: F                      

      Test: SODIUM LEVEL; Value: 142; Range: 136-145; Units: MEQ/L; Status: F                     

      Test: POTASSIUM SERUM; Value: 3.8; Range: 3.5-5.1; Units: MEQ/L; Status: F                  

      Test: CHLORIDE LEVEL; Value: 106; Range: ; Units: MEQ/L; Status: F                    

      Test: CARBON DIOXIDE LEVEL; Value: 28; Range: 21-32; Units: MEQ/L; Status: F                

      Test: ANION GAP; Value: 8; Range: 8-16; Units: MEQ/L; Status: F                             

      Test: CALCIUM LEVEL; Value: 9.0; Range: 8.5-10.1; Units: MG/DL; Status: F                   

      Test Note: &nbsp;; Units are mL/min/1.73 m2 Chronic Kidney Disease Staging per NKF:       

      Stage I & II GFR >=60 Normal to Mildly Decreased Stage III GFR 30-59 Moderately           

      Decreased Stage IV GFR 15-29 Severely Decreased Stage V GFR <15 Very Little GFR Left        

      ESRD GFR <15 on RRT                                                                         

Lab Order: CBC with Diff; SPEC'17 20:19                                                   

      Test: WHITE BLOOD COUNT; Value: 8.6; Range: 4.0-10.0; Units: K/mm3; Status: F               

      Test: RED BLOOD COUNT; Value: 4.67; Range: 4.00-5.40; Units: M/mm3; Status: F               

      Test: HEMOGLOBIN; Value: 13.8; Range: 12.0-16.0; Units: g/dl; Status: F                     

      Test: HEMATOCRIT; Value: 42.3; Range: 36.0-47.0; Units: %; Status: F                        

      Test: MEAN CORPUSCULAR VOLUME; Value: 90.5; Range: 80.0-96.0; Units: fl; Status: F          

      Test: MEAN CORPUSCULAR HEMOGLOBIN; Value: 29.6; Range: 27.0-33.0; Units: pg; Status: F      

      Test: MEAN CORPUSCULAR HGB CONC; Value: 32.8; Range: 32.0-36.5; Units: g/dl; Status: F      

      Test: RED CELL DISTRIBUTION WIDTH; Value: 13.2; Range: 11.5-14.5; Units: %; Status: F       

      Test: PLATELET COUNT, AUTOMATED; Value: 208; Range: 150-450; Units: k/mm3; Status: F        

      Test: NEUTROPHILS %; Value: 61.4; Range: 36.0-66.0; Units: %; Status: F                     

      Test: LYMPH %; Value: 28.8; Range: 24.0-44.0; Units: %; Status: F                           

      Test: MONO %; Value: 5.1; Range: 0.0-5.0; Abnormal: Above high normal; Units: %;            

      Status: F                                                                                   

      Test: EOS %; Value: 1.0; Range: 0.0-3.0; Units: %; Status: F                                

      Test: BASO %; Value: 0.5; Range: 0.0-1.0; Units: %; Status: F                               

      Test: LARGE UNSTAINED CELL %; Value: 3.0; Range: 0.0-4.0; Units: %; Status: F               

      Test: NEUTROPHILS #; Value: 5.3; Range: 1.8-7.7; Units: K/mm3; Status: F                    

      Test: LYMPH #; Value: 2.5; Range: 1.5-6.5; Units: K/mm3; Status: F                          

      Test: MONO #; Value: 0.4; Range: 0.0-0.8; Units: K/mm3; Status: F                           

      Test: EOS #; Value: 0.1; Range: 0.0-0.50; Units: K/mm3; Status: F                           

      Test: BASO #; Value: 0.0; Range: 0.0-0.2; Units: K/mm3; Status: F                           

      Test: LARGE UNSTAINED CELL #; Value: 0.3; Range: 0.0-0.4; Units: K/mm3; Status: F           

Lab Order: Lipase; SPEC' 17 20:19                                                          

      Test: LIPASE; Value: 96; Range: ; Units: U/L; Status: F                               

Lab Order: Liver Profile; SPEC' 17 20:19                                                   

      Test: AST/SGOT; Value: 15; Range: 15-37; Units: U/L; Status: F                              

      Test: ALT/SGPT; Value: 20; Range: 12-78; Units: U/L; Status: F                              

      Test: ALKALINE PHOSPHATASE; Value: 66; Range: ; Units: U/L; Status: F                 

      Test: BILIRUBIN,TOTAL; Value: 0.2; Range: 0.2-1.0; Units: MG/DL; Status: F                  

      Test: BILIRUBIN,DIRECT; Value: < 0.1; Range: 0.0-0.2; Units: MG/DL; Status: F               

      Test: TOTAL PROTEIN; Value: 6.8; Range: 6.4-8.2; Units: GM/DL; Status: F                    

      Test: ALBUMIN; Value: 3.5; Range: 3.2-5.2; Units: GM/DL; Status: F                          

      Test: ALBUMIN/GLOBULIN RATIO; Value: 1.06; Range: 1.00-1.93; Status: F                      

                                                                                                  

Radiology Order: CT ABD & PELVIS: No Contrast                                                   

      Test: CT ABD & PELVIS: No Contrast                                                        

      REASON FOR EXAMINATION: right renal colic; ; CT of the abdomen and pelvis without           

      contrast; Clinical statement: Pain.; Technique: Multiple axial CT images were obtained      

      from the base of the lungs to the floor of the pelv; is utilizing 5 mm axial slices         

      without administration of contrast. Coronal and sagittal reconstructio; ns were also        

      obtained.; No comparison is available.; Findings:; Chest: The visualized lung bases are     

      clear.; Abdomen: The kidneys are normal in size bilaterally. There is moderate              

      right-sided hydronephrosis cau; sed by a 4 mm obstructing stone at the right                

      ureteropelvic junction. The liver, spleen, pancreas, gal; lbladder and adrenal glands       

      are unremarkable. The aorta demonstrates normal caliber and contour. Ther; e is no          

      abdominal lymphadenopathy or ascites.; Pelvis: The bowel is unremarkable, with no           

      obstructive or inflammatory changes. The appendix is simone; l. The urinary bladder is       

      within normal limits. There is no pelvic lymphadenopathy or ascites. The ot; her pelvic     

      structures appear unremarkable.; Bones: There are no suspicious osseous abnormalities       

      seen.; Impression: Mild right-sided hydronephrosis caused by a 4 mm obstructing stone       

      at the right ureterope; lvic junction.; Electronically signed by JODI CAMARILLO MD on     

      2017 10:05:26 PM ET;                                                                  

Outcome:                                                                                          

22:44 Discharge ordered by Provider.                                                          ar2 

23:05 Discharge Assessment: Patient awake, alert and oriented x 3. No cognitive and/or        ld5 

      functional deficits noted. Patient verbalized understanding of disposition                  

      instructions. patient administered narcotics - no. The following High Risk Discharge        

      criteria are identified: None. Discharged to home ambulatory, with friend. Condition:       

      stable. Discharge instructions given to patient, Instructed on discharge instructions,      

      follow up and referral plans. medication usage, no driving heavy equipment,                 

      Demonstrated understanding of instructions, medications, Pt was receptive of discharge      

      instructions/ teaching. Prescriptions given X 4. CT Study completed. Property :Personal     

      belongings accompany Pt.                                                                    

23:07 Patient left the ED.                                                                    ld5 

                                                                                                  

Signatures:                                                                                       

Dispatcher [employer]                           EDMS                                                 

Juan C Carr, RN                      RN   cz                                                   

Shahla Qureshi, Reg                  Reg  Alvino Frerara Aaron, PA-C                 PA-C ar2                                                  

Itzel Mayen RN RN   ld5                                                  

Anuradha Whipple RN RN   [facility]                                                  

Kavon Scott                            gr2                                                  

Joan Nichols, PCA                   PCA  cln                                                  

Anuradha Whipple RN                               [facility]                                                  

                                                                                                  

**************************************************************************************************



*** Chart Complete ***
MTDD

## 2017-02-20 NOTE — EDDOCDS
Physician Documentation                                                                           

[facility]                                                                         

Name: Marjorie Graves                                                                             

Age: 25 yrs                                                                                       

Sex: Female                                                                                       

: 1991                                                                                   

MRN: [id number]                                                                                     

Arrival Date: 2017                                                                          

Time: 19:40                                                                                       

Account#: [id number]                                                                              

Bed I5 / M5                                                                                       

Private MD: [facility]                                                                       

Disposition:                                                                                      

17 22:44 Discharged to Home/Self Care. Impression: Hydronephrosis with renal and            

ureteral calculous obstruction - right 4mm UPJ stone with mild hydronephrosis.                  

- Condition is Stable.                                                                            

- Discharge Instructions: Kidney Stones.                                                          

- Prescriptions for Ibuprofen 800 mg Oral Tablet - take 1 tablet by ORAL route every 8            

hours As needed take with food; 30 tablet. Norco 5- 325 mg Oral Tablet - take 1                 

tablet by ORAL route every 6 hours As needed MDD: 4 tabs; 16 tablet. Flomax 0.4 mg              

Oral Capsule, Sust. Release 24 hr - take 1 capsule by ORAL route once daily 1/2 hour            

following the same meal each day; 15 capsule. ZOFRAN ODT 4 mg Oral - dissolve 1                 

tablet by ORAL route every 8 hours As needed do not chew, do not swallow whole; 10              

tablet.                                                                                         

- Medication Reconciliation, Local Pharmacy Hours form.                                           

- Follow up: Jazmin Adkins MD; When: Call to arrange an appointment; Reason: Recheck           

today's complaints, Continuance of care. Follow up: Emergency Department; When: As              

needed; Reason: Worsening of conditions.                                                        

- Problem is new.                                                                                 

- Symptoms have improved.                                                                         

- Notes: call monday for follow up appointment. drink plenty of fluids                            

                                                                                                  

                                                                                                  

Historical:                                                                                       

- Allergies: No known drug Allergies;                                                             

- Home Meds:                                                                                      

1. birth control                                                                                

- PMHx: none;                                                                                     

- PSHx: ;                                                                                

- Social history: Smoking status: Patient states was never smoker of tobacco. No                  

barriers to communication noted, The patient speaks fluent English, Speaks                      

appropriately for age.                                                                          

- Family history: Not pertinent.                                                                  

- : The pt / caregiver states he / she is not on anticoagulants. Home medication list             

is obtained from the patient.                                                                   

- Exposure Risk Screening:: None identified.                                                      

                                                                                                  

                                                                                                  

OB/GYN:                                                                                           

                                                                                             

19:52 no periods due to birth control pill                                                    cz  

                                                                                                  

Vital Signs:                                                                                      

19:42  / 71; Pulse 72; Resp 18 S; Temp 97.9(O); Pulse Ox 99% on R/A; Weight 53.52 kg /  gr2 

      117.99 lbs (R); Height 4 ft. 11 in. (149.86 cm) (R); Pain 9/10;                             

23:05  / 71; Pulse 74; Resp 16; Temp 97.8; Pulse Ox 100% on R/A; Pain 2/10;             ld5 

19:42 Body Mass Index 23.83 (53.52 kg, 149.86 cm)                                             gr2 

                                                                                                  

MDM:                                                                                              

19:57 UCG by Nursing ordered.                                                                 cz  

19:58 Urinalysis Ordered.                                                                     EDMS

19:58 Urine Culture Ordered.                                                                  EDMS

20:03 Undress patient appropriately for examination ordered.                                  ar2 

20:03 IV Saline Lock ordered.                                                                 ar2 

20:03 Ondansetron 4 mg IVP once ordered.                                                      ar2 

20:05 Amylase Ordered.                                                                        EDMS

20:05 Basic Metabolic Profile Ordered.                                                        EDMS

20:05 CBC with Diff Ordered.                                                                  EDMS

20:05 Lipase Ordered.                                                                         EDMS

20:05 Liver Profile Ordered.                                                                  EDMS

20:06 NOTHING BY MOUTH+DIET ordered.                                                          EDMS

20:42 ketorolac 30 mg IVP once ordered.                                                       ar2 

20:52 Urinalysis Reviewed.                                                                    ar2 

20:52 CBC with Diff Reviewed.                                                                 ar2 

20:52 Amylase Reviewed.                                                                       ar2 

20:52 Basic Metabolic Profile Reviewed.                                                       ar2 

20:52 Lipase Reviewed.                                                                        ar2 

20:52 Liver Profile Reviewed.                                                                 ar2 

20:53 NS 0.9% 1000 ml IV at bolus once ordered.                                               ar2 

20:53 CT ABD & PELVIS: No Contrast Ordered.                                                   EDMS

21:48 Financial registration complete.                                                        zo  

22:25 [facility] Payment Agreement was scanned into [employer] and attached to record.               zo  

22:42 Dispense Urine Strainer ordered.                                                        ar2 

22:47 HYDROcodone-acetaminophen 4 pack- 5 mg-325 mg 1 packets PO Per package directions;      ar2 

      Dispense with patient. 1 po q4h prn for pain ordered.                                       

22:48 CT ABD & PELVIS: No Contrast Reviewed.                                                  ar2

                                                                                             

11:30 T-Sheet-- Draft Copy was scanned into [employer] and attached to record.                   gb  

11:30 Radiology Report was scanned into [employer] and attached to record.                       gb  

                                                                                                  

Point of Care Testing:                                                                            

      Urine Pregnancy:                                                                            

                                                                                             

20:16 hCG Reading: Negative; Control Reading: Positive;                                       cln 

      Ranges:                                                                                     

                                                                                                  

Administered Medications:                                                                         

20:24 Drug: Ondansetron 4 mg [ondansetron HCl 2 mg/mL intravenous solution (2 mL)] Route:     ld5 

      IVP; Site: left antecubital;                                                                

23:05 Follow up: Response: Nausea is decreased                                                ld5 

20:48 Drug: ketorolac 30 mg [ketorolac 30 mg/mL (1 mL) injection solution (1 mL)] Route: IVP; ld5 

      Site: left antecubital;                                                                     

23:00 Follow up: Response: Med's dispensed home                                               ld5 

21:11 Drug: NS 0.9% 1000 ml [sodium chloride 0.9 % intravenous solution] Route: IV; Rate:     ld5 

      bolus; Site: left antecubital;                                                              

22:59 Follow up: IV Status: Completed infusion; IV Intake: 1000ml                             ld5 

22:59 Drug: HYDROcodone-acetaminophen 4 pack- 1 packets [hydrocodone 5 mg-acetaminophen 325   ld5 

      mg tablet (1 tabs)] {Co-Signature: [facility] (Anuradha Whipple RN).} Route: PO;                        

22:59 Follow up: Response: Med's dispensed home                                               ld5 

                                                                                                  

                                                                                                  

Signatures:                                                                                       

Dispatcher [employer]                           EDMS                                                 

Juan C Carr RN RN   cz                                                   

Shahla Qureshi, Reg                  Reg  gb                                                   

Alvino Machuca Aaron, PA-C PA-C ar2                                                  

Itzel Mayen RN RN   [facility]                                                  

Anuradha Whipple RN RN   [facility]                                                  

Anuradha Whipple RN                               [facility]                                                  

                                                                                                  

The chart was reviewed and I authenticate all verbal orders and agree with the evaluation and 
treatment provided.Attachments:

22:25 [facility] Payment Agreement                                                                zo  

                                                                                             

11:30 T-Sheet-- Draft Copy                                                                    gb  

                                                                                                  

**************************************************************************************************



*** Chart Complete ***
MTDD

## 2017-02-20 NOTE — EDDOCDS
Physician Documentation                                                                           

[facility]                                                                         

Name: Marjorie Graves                                                                             

Age: 25 yrs                                                                                       

Sex: Female                                                                                       

: 1991                                                                                   

MRN: [id number]                                                                                     

Arrival Date: 2017                                                                          

Time: 19:40                                                                                       

Account#: [id number]                                                                              

Bed I5 / M5                                                                                       

Private MD: [facility]                                                                       

Disposition:                                                                                      

17 22:44 Discharged to Home/Self Care. Impression: Hydronephrosis with renal and            

ureteral calculous obstruction - right 4mm UPJ stone with mild hydronephrosis.                  

- Condition is Stable.                                                                            

- Discharge Instructions: Kidney Stones.                                                          

- Prescriptions for Ibuprofen 800 mg Oral Tablet - take 1 tablet by ORAL route every 8            

hours As needed take with food; 30 tablet. Norco 5- 325 mg Oral Tablet - take 1                 

tablet by ORAL route every 6 hours As needed MDD: 4 tabs; 16 tablet. Flomax 0.4 mg              

Oral Capsule, Sust. Release 24 hr - take 1 capsule by ORAL route once daily 1/2 hour            

following the same meal each day; 15 capsule. ZOFRAN ODT 4 mg Oral - dissolve 1                 

tablet by ORAL route every 8 hours As needed do not chew, do not swallow whole; 10              

tablet.                                                                                         

- Medication Reconciliation, Local Pharmacy Hours form.                                           

- Follow up: Jazmin Adkins MD; When: Call to arrange an appointment; Reason: Recheck           

today's complaints, Continuance of care. Follow up: Emergency Department; When: As              

needed; Reason: Worsening of conditions.                                                        

- Problem is new.                                                                                 

- Symptoms have improved.                                                                         

- Notes: call monday for follow up appointment. drink plenty of fluids                            

                                                                                                  

                                                                                                  

Historical:                                                                                       

- Allergies: No known drug Allergies;                                                             

- Home Meds:                                                                                      

1. birth control                                                                                

- PMHx: none;                                                                                     

- PSHx: ;                                                                                

- Social history: Smoking status: Patient states was never smoker of tobacco. No                  

barriers to communication noted, The patient speaks fluent English, Speaks                      

appropriately for age.                                                                          

- Family history: Not pertinent.                                                                  

- : The pt / caregiver states he / she is not on anticoagulants. Home medication list             

is obtained from the patient.                                                                   

- Exposure Risk Screening:: None identified.                                                      

                                                                                                  

                                                                                                  

OB/GYN:                                                                                           

                                                                                             

19:52 no periods due to birth control pill                                                    cz  

                                                                                                  

Vital Signs:                                                                                      

19:42  / 71; Pulse 72; Resp 18 S; Temp 97.9(O); Pulse Ox 99% on R/A; Weight 53.52 kg /  gr2 

      117.99 lbs (R); Height 4 ft. 11 in. (149.86 cm) (R); Pain 9/10;                             

23:05  / 71; Pulse 74; Resp 16; Temp 97.8; Pulse Ox 100% on R/A; Pain 2/10;             ld5 

19:42 Body Mass Index 23.83 (53.52 kg, 149.86 cm)                                             gr2 

                                                                                                  

MDM:                                                                                              

19:57 UCG by Nursing ordered.                                                                 cz  

19:58 Urinalysis Ordered.                                                                     EDMS

19:58 Urine Culture Ordered.                                                                  EDMS

20:03 Undress patient appropriately for examination ordered.                                  ar2 

20:03 IV Saline Lock ordered.                                                                 ar2 

20:03 Ondansetron 4 mg IVP once ordered.                                                      ar2 

20:05 Amylase Ordered.                                                                        EDMS

20:05 Basic Metabolic Profile Ordered.                                                        EDMS

20:05 CBC with Diff Ordered.                                                                  EDMS

20:05 Lipase Ordered.                                                                         EDMS

20:05 Liver Profile Ordered.                                                                  EDMS

20:06 NOTHING BY MOUTH+DIET ordered.                                                          EDMS

20:42 ketorolac 30 mg IVP once ordered.                                                       ar2 

20:52 Urinalysis Reviewed.                                                                    ar2 

20:52 CBC with Diff Reviewed.                                                                 ar2 

20:52 Amylase Reviewed.                                                                       ar2 

20:52 Basic Metabolic Profile Reviewed.                                                       ar2 

20:52 Lipase Reviewed.                                                                        ar2 

20:52 Liver Profile Reviewed.                                                                 ar2 

20:53 NS 0.9% 1000 ml IV at bolus once ordered.                                               ar2 

20:53 CT ABD & PELVIS: No Contrast Ordered.                                                   EDMS

21:48 Financial registration complete.                                                        zo  

22:25 [facility] Payment Agreement was scanned into [employer] and attached to record.               zo  

22:42 Dispense Urine Strainer ordered.                                                        ar2 

22:47 HYDROcodone-acetaminophen 4 pack- 5 mg-325 mg 1 packets PO Per package directions;      ar2 

      Dispense with patient. 1 po q4h prn for pain ordered.                                       

22:48 CT ABD & PELVIS: No Contrast Reviewed.                                                  ar2

                                                                                             

11:30 T-Sheet-- Draft Copy was scanned into [employer] and attached to record.                   gb  

11:30 Radiology Report was scanned into [employer] and attached to record.                       gb  

                                                                                                  

Point of Care Testing:                                                                            

      Urine Pregnancy:                                                                            

                                                                                             

20:16 hCG Reading: Negative; Control Reading: Positive;                                       cln 

      Ranges:                                                                                     

                                                                                                  

Administered Medications:                                                                         

20:24 Drug: Ondansetron 4 mg [ondansetron HCl 2 mg/mL intravenous solution (2 mL)] Route:     ld5 

      IVP; Site: left antecubital;                                                                

23:05 Follow up: Response: Nausea is decreased                                                ld5 

20:48 Drug: ketorolac 30 mg [ketorolac 30 mg/mL (1 mL) injection solution (1 mL)] Route: IVP; ld5 

      Site: left antecubital;                                                                     

23:00 Follow up: Response: Med's dispensed home                                               ld5 

21:11 Drug: NS 0.9% 1000 ml [sodium chloride 0.9 % intravenous solution] Route: IV; Rate:     ld5 

      bolus; Site: left antecubital;                                                              

22:59 Follow up: IV Status: Completed infusion; IV Intake: 1000ml                             ld5 

22:59 Drug: HYDROcodone-acetaminophen 4 pack- 1 packets [hydrocodone 5 mg-acetaminophen 325   ld5 

      mg tablet (1 tabs)] {Co-Signature: [facility] (Anuradha Whipple RN).} Route: PO;                        

22:59 Follow up: Response: Med's dispensed home                                               ld5 

                                                                                                  

                                                                                                  

Signatures:                                                                                       

Dispatcher [employer]                           EDMS                                                 

Juan C Carr RN RN   cz                                                   

Shahla Qureshi, Reg                  Reg  gb                                                   

Alvino Machuca Aaron, PA-C PA-C ar2                                                  

Itzel Mayen RN RN   [facility]                                                  

Anuradha Whipple RN RN   [facility]                                                  

Anuradha Whipple RN                               [facility]                                                  

                                                                                                  

The chart was reviewed and I authenticate all verbal orders and agree with the evaluation and 
treatment provided.Attachments:

22:25 [facility] Payment Agreement                                                                zo  

                                                                                             

11:30 T-Sheet-- Draft Copy                                                                    gb  

                                                                                                  

**************************************************************************************************



*** Chart Complete ***
MTDD

## 2017-02-20 NOTE — EDDOCDS
Nurse's Notes                                                                                     

[facility]                                                                         

Name: Marjorie Graves                                                                             

Age: 25 yrs                                                                                       

Sex: Female                                                                                       

: 1991                                                                                   

MRN: [id number]                                                                                     

Arrival Date: 2017                                                                          

Time: 19:40                                                                                       

Account#: [id number]                                                                              

Bed I5 / M5                                                                                       

Private MD: Libertad                                                                       

Diagnosis: Hydronephrosis with renal and ureteral calculous obstruction-right 4mm UPJ stone with  

mild hydronephrosis                                                                             

                                                                                                  

Presentation:                                                                                     

                                                                                             

19:50 Presenting complaint: Patient states: stomach pain since earlier today pain back side   cz  

      and worse now pt denies UTI symptoms. Acute neurological deficits are not present.          

      Mechanism of Injury: No Mechanism of Injury. Adult Sepsis Screening: The patient does       

      not have new or worsening altered mentation. Patient's respiratory rate is less than        

      22. Systolic blood pressure is greater than 100. Patient has a qSOFA score of 0-            

      Negative Sepsis Screen. Suicide/Homicide risk assessment- the patient denies having any     

      suicidal and/or homicidal ideations and does not present with any other emotional,          

      behavioral or mental health complaints.  Status: The patient is a           

      dependent. Transition of care: patient was not received from another setting of care.       

19:50 Acuity: TANG Level 3                                                                     cz  

19:50 Method Of Arrival: Walkin/Carried/Asstd                                                 cz  

                                                                                                  

Triage Assessment:                                                                                

19:52 General: Appears uncomfortable. Pain: Location: back Pain currently is 8 out of 10 on a cz  

      pain scale. Pt Declines HIV testing.                                                        

                                                                                                  

OB/GYN:                                                                                           

19:52 no periods due to birth control pill                                                    cz  

                                                                                                  

Historical:                                                                                       

- Allergies: No known drug Allergies;                                                             

- Home Meds:                                                                                      

1. birth control                                                                                

- PMHx: none;                                                                                     

- PSHx: ;                                                                                

- Social history: Smoking status: Patient states was never smoker of tobacco. No                  

barriers to communication noted, The patient speaks fluent English, Speaks                      

appropriately for age.                                                                          

- Family history: Not pertinent.                                                                  

- : The pt / caregiver states he / she is not on anticoagulants. Home medication list             

is obtained from the patient.                                                                   

- Exposure Risk Screening:: None identified.                                                      

                                                                                                  

                                                                                                  

Screenin:29 Screening information is obtained from the patient. Fall risk: No risks identified.     cjh 

      Assistance ADL's: requires no assistance with activities of daily living. Abuse/DV          

      Screen: The patient / caregiver reports he/she is: not in a situation that causes fear,     

      pain or injury. Nutritional screening: No deficits noted. Advance Directives: There is      

      no active DNR order. home support is adequate.                                              

                                                                                                  

Assessment:                                                                                       

20:29 General: Appears in no apparent distress, comfortable, Behavior is appropriate for age, cjh 

      cooperative. Pain: Location: posterior aspect of right lateral abdomen and anterior         

      aspect of right lateral abdomen Pain currently is 8 out of 10 on a pain scale.              

      Respiratory: Airway is patent Respiratory effort is even, unlabored, Respiratory            

      pattern is regular, symmetrical. GI: Abdomen is non- distended Bowel sounds present X 4     

      quads. Abd is soft and non tender X 4 quads. Musculoskeletal: Range of motion intact in     

      all extremities.                                                                            

21:10 General: Pt laying in bed talking on phone. States "I feel so much better". Bolus       ld5 

      started per orders. Will continue to monitor.                                               

21:53 General: returned from CT, tolerated well, awaiting results, friends at bedside, will   [facility] 

      continue to monitor.                                                                        

22:36 General: Pt ambulated to bathroom. Tolerated well. Reports minimal pain. Resting        ld5 

      comfortably in bed. Friends at bedside. Will continue to monitor.                           

23:05 General: Appears in no apparent distress, Behavior is cooperative. Pain: Pain currently ld5 

      is 2 out of 10 on a pain scale. Neurological: Level of Consciousness is awake, alert.       

      Respiratory: Airway is patent Respiratory effort is even, unlabored.                        

                                                                                                  

Vital Signs:                                                                                      

19:42  / 71; Pulse 72; Resp 18 S; Temp 97.9(O); Pulse Ox 99% on R/A; Weight 53.52 kg    gr2 

      (R); Height 4 ft. 11 in. (149.86 cm) (R); Pain 9/10;                                        

23:05  / 71; Pulse 74; Resp 16; Temp 97.8; Pulse Ox 100% on R/A; Pain 2/10;             ld5 

19:42 Body Mass Index 23.83 (53.52 kg, 149.86 cm)                                             gr2 

                                                                                                  

Vitals:                                                                                           

19:42 Log In Time: 2017 at 19:42.                                                gr2 

                                                                                                  

ED Course:                                                                                        

19:42 Patient visited by Kavon Scott.                                                   gr2 

19:42 Libertad is Private Physician.                                                   gr2 

19:42 Patient moved to Waiting                                                                gr2 

19:44 Patient visited by Kavon Scott.                                                   gr2 

19:44 Patient moved to Pre RCE                                                                gr2 

19:51 Triage Initiated                                                                        cz  

19:55 [facility]                                                        ar2 

19:55 Patient moved to Triage 2                                                               cz  

19:56 David Rhoades DO is Attending Physician.                                            ar2 

19:56 Patient visited by Reji Rudolph PA-C.                                             ar2 

20:06 Patient moved to I5 / M5                                                                cz  

20:16 Patient visited by Joan Nichols PCA.                                               cln 

20:16 Urinalysis Sent.                                                                        cln 

20:16 Urine Culture Sent.                                                                     cln 

20:29 The patient / caregiver is instructed regarding the plan of care and ED course.         [facility] 

20:29 Inserted saline lock: 20 gauge in left antecubital area and blood collected. No         [facility] 

      procedures done that require assistance. Labs drawn. (by ED staff). Sent per order to       

      lab.                                                                                        

20:48 Patient visited by Itzel Mayen RN.                                                  ld5 

21:11 Patient visited by Itzel Mayen RN.                                                  ld5 

21:54 Patient visited by Anuradha Whipple RN.                                                      cjh 

22:12 CT ABD & PELVIS: No Contrast Returned.                                                  EDMS

22:25 [facility] Payment Agreement was scanned into [employer] and attached to record.               zo  

22:37 Patient visited by Itzel Mayen RN.                                                  ld5 

22:43 Jazmin Adkins MD is Referral Physician.                                              ar2 

23:05 Discontinued lock intact, bleeding controlled, pressure dressing applied, No            ld5 

      redness/swelling at site.                                                                   

23:07 Patient visited by Itzel Mayen RN.                                                  ld5 

                                                                                             

11:30 T-Sheet-- Draft Copy was scanned into [employer] and attached to record.                   gb  

11:30 Radiology Report was scanned into [employer] and attached to record.                       gb  

                                                                                                  

Administered Medications:                                                                         

                                                                                             

20:24 Drug: Ondansetron 4 mg [ondansetron HCl 2 mg/mL intravenous solution (2 mL)] Route:     ld5 

      IVP; Site: left antecubital;                                                                

23:05 Follow up: Response: Nausea is decreased                                                ld5 

20:48 Drug: ketorolac 30 mg [ketorolac 30 mg/mL (1 mL) injection solution (1 mL)] Route: IVP; ld5 

      Site: left antecubital;                                                                     

23:00 Follow up: Response: Med's dispensed home                                               ld5 

21:11 Drug: NS 0.9% 1000 ml [sodium chloride 0.9 % intravenous solution] Route: IV; Rate:     ld5 

      bolus; Site: left antecubital;                                                              

22:59 Follow up: IV Status: Completed infusion; IV Intake: 1000ml                             ld5 

22:59 Drug: HYDROcodone-acetaminophen 4 pack- 1 packets [hydrocodone 5 mg-acetaminophen 325   ld5 

      mg tablet (1 tabs)] {Co-Signature: [facility] (Anuradha Whipple RN).} Route: PO;                        

22:59 Follow up: Response: Med's dispensed home                                               ld5 

                                                                                                  

                                                                                                  

Point of Care Testing:                                                                            

      Urine Pregnancy:                                                                            

20:16 hCG Reading: Negative; Control Reading: Positive;                                       cln 

      Ranges:                                                                                     

                                                                                                  

Intake:                                                                                           

22:59 IV: 1000.00ml; Total: 1000.00ml.                                                        ld5 

                                                                                                  

Order Results:                                                                                    

Lab Order: Urine Culture; SPEC'M 17 19:58                                                   

      Test: URINE CULTURE; Value: <EXTERNAL COMMENT eCWMed> FULL REPORT IN LAB NOTES (eCW and     

      Medent).; Status: F                                                                         

      Test: URINE CULTURE; Value: ORGANISM 1: PROTEUS MIRABILIS; Status: F                        

      Test: URINE CULTURE; Value: PROTEUS MIRABILIS; Status: F                                    

      Test: URINE CULTURE; Value: COLONY COUNT CFU/ml >100,000; Status: F                         

      Test: URINE CULTURE; Value: GRAM NEG SENSI - VITEK 80; Status: F                            

      Test: URINE CULTURE; Value: Method: VIT2; Status: F                                         

      Test: URINE CULTURE; Value: TRIMETHOPRIM/SULFAMETHOXAZOLE >=320 R; Status: F                

      Test: URINE CULTURE; Value: AMPICILLIN <=2 S; Status: F                                     

      Test: URINE CULTURE; Value: GENTAMICIN <=1 S; Status: F                                     

      Test: URINE CULTURE; Value: NITROFURANTOIN 128 R; Status: F                                 

      Test: URINE CULTURE; Value: CEFAZOLIN <=4 S; Status: F                                      

      Test: URINE CULTURE; Value: LEVOFLOXACIN <=0.12 S; Status: F                                

      Test: URINE CULTURE; Value: TOBRAMYCIN <=1 S; Status: F                                     

      Test: URINE CULTURE; Value: CEFTRIAXONE <=1 S; Status: F                                    

      Test: URINE CULTURE; Value: CEFTAZIDIME <=1 S; Status: F                                    

      Test: URINE CULTURE; Value: AMPICILLIN/SULBACTAM <=2 S; Status: F                           

      Test: URINE CULTURE; Value: PIPERACILLIN/TAZOBACTAM <=4 S; Status: F                        

      Test: URINE CULTURE; Value: AZTREONAM <=1 S; Status: F                                      

      Test: URINE CULTURE; Value: ERTAPENEM <=0.5 S; Status: F                                    

      Test: URINE CULTURE; Value: MEROPENEM <=0.25 S; Status: F                                   

      Test: URINE CULTURE; Value: TIGECYCLINE 4 R; Status: F                                      

      Test: URINE CULTURE; Value: CEFEPIME <=1 S; Status: F                                       

Lab Order: Urinalysis; SPEC'M 17 19:58                                                      

      Test: APPEARANCE, URINE; Value: CLEAR; Range: CLEAR; Status: F                              

      Test: COLOR, URINE; Value: YELLOW; Range: YELLOW; Status: F                                 

      Test: PH,URINE; Value: 8.0; Range: 5.0-9.0; Units: UNITS; Status: F                         

      Test: SPECIFIC GRAVITY URINE AUTO; Value: 1.018; Range: 1.002-1.035; Status: F              

      Test: PROTEIN, URINE AUTO; Value: 1+; Range: NEGATIVE; Abnormal: Above high normal;         

      Units: mg/dL; Status: F                                                                     

      Test: GLUCOSE, URINE (UA) AUTO; Value: NEGATIVE; Range: NEGATIVE; Units: mg/dL; Status:     

      F                                                                                           

      Test: KETONE, URINE AUTO; Value: NEGATIVE; Range: NEGATIVE; Units: mg/dL; Status: F         

      Test: UROBILINOGEN, URINE AUTO; Value: 0.2; Range: 0.0-2.0; Units: mg/dL; Status: F         

      Test: BILIRUBIN, URINE AUTO; Value: NEGATIVE; Range: NEGATIVE; Status: F                    

      Test: NITRITE, URINE AUTO; Value: NEGATIVE; Range: NEGATIVE; Status: F                      

      Test: LEUKOCYTE ESTERASE, URINE AUTO; Value: TRACE; Range: NEGATIVE; Abnormal: Above        

      high normal; Status: F                                                                      

      Test: BLOOD, URINE BLOOD; Value: 3+; Range: NEGATIVE; Abnormal: Above high normal;          

      Status: F                                                                                   

      Test: WBC, URINE AUTO; Value: 13; Range: 0-3; Abnormal: Above high normal; Units: /HPF;     

      Status: F                                                                                   

      Test: RBC, URINE AUTO; Value: TNTC; Range: 0-3; Abnormal: Above high normal; Units:         

      /HPF; Status: F                                                                             

      Test: BACTERIA, URINE AUTO; Value: 1+; Range: NEGATIVE; Abnormal: Above high normal;        

      Status: F                                                                                   

      Test: SQUAMOUS EPITHELIAL CELL UR AU; Value: 1; Range: 0-6; Units: /HPF; Status: F          

      Test: MUCUS, URINE; Value: SMALL; Range: NEGATIVE; Status: F                                

      Test: HYALINE CAST, URINE AUTO; Value: 0; Range: 0-1; Units: /LPF; Status: F                

      Test: AMORPHOUS SEDIMENT; Value: SMALL; Range: NEGATIVE; Abnormal: Above high normal;       

      Status: F                                                                                   

Lab Order: Amylase; SPEC'17 20:19                                                         

      Test: AMYLASE; Value: 65; Range: ; Units: U/L; Status: F                              

Lab Order: Basic Metabolic Profile; SPEC'17 20:19                                         

      Test: GLUCOSE, FASTING; Value: 87; Range: ; Units: MG/DL; Status: F                   

      Test: BLOOD UREA NITROGEN; Value: 8; Range: 7-18; Units: MG/DL; Status: F                   

      Test: CREATININE FOR GFR; Value: 0.92; Range: 0.55-1.02; Units: MG/DL; Status: F            

      Test: GLOMERULAR FILTRATION RATE; Value: > 60.0; Range: >60; Status: F                      

      Test: SODIUM LEVEL; Value: 142; Range: 136-145; Units: MEQ/L; Status: F                     

      Test: POTASSIUM SERUM; Value: 3.8; Range: 3.5-5.1; Units: MEQ/L; Status: F                  

      Test: CHLORIDE LEVEL; Value: 106; Range: ; Units: MEQ/L; Status: F                    

      Test: CARBON DIOXIDE LEVEL; Value: 28; Range: 21-32; Units: MEQ/L; Status: F                

      Test: ANION GAP; Value: 8; Range: 8-16; Units: MEQ/L; Status: F                             

      Test: CALCIUM LEVEL; Value: 9.0; Range: 8.5-10.1; Units: MG/DL; Status: F                   

      Test Note: &nbsp;; Units are mL/min/1.73 m2 Chronic Kidney Disease Staging per NKF:       

      Stage I & II GFR >=60 Normal to Mildly Decreased Stage III GFR 30-59 Moderately           

      Decreased Stage IV GFR 15-29 Severely Decreased Stage V GFR <15 Very Little GFR Left        

      ESRD GFR <15 on RRT                                                                         

Lab Order: CBC with Diff; SPEC'17 20:19                                                   

      Test: WHITE BLOOD COUNT; Value: 8.6; Range: 4.0-10.0; Units: K/mm3; Status: F               

      Test: RED BLOOD COUNT; Value: 4.67; Range: 4.00-5.40; Units: M/mm3; Status: F               

      Test: HEMOGLOBIN; Value: 13.8; Range: 12.0-16.0; Units: g/dl; Status: F                     

      Test: HEMATOCRIT; Value: 42.3; Range: 36.0-47.0; Units: %; Status: F                        

      Test: MEAN CORPUSCULAR VOLUME; Value: 90.5; Range: 80.0-96.0; Units: fl; Status: F          

      Test: MEAN CORPUSCULAR HEMOGLOBIN; Value: 29.6; Range: 27.0-33.0; Units: pg; Status: F      

      Test: MEAN CORPUSCULAR HGB CONC; Value: 32.8; Range: 32.0-36.5; Units: g/dl; Status: F      

      Test: RED CELL DISTRIBUTION WIDTH; Value: 13.2; Range: 11.5-14.5; Units: %; Status: F       

      Test: PLATELET COUNT, AUTOMATED; Value: 208; Range: 150-450; Units: k/mm3; Status: F        

      Test: NEUTROPHILS %; Value: 61.4; Range: 36.0-66.0; Units: %; Status: F                     

      Test: LYMPH %; Value: 28.8; Range: 24.0-44.0; Units: %; Status: F                           

      Test: MONO %; Value: 5.1; Range: 0.0-5.0; Abnormal: Above high normal; Units: %;            

      Status: F                                                                                   

      Test: EOS %; Value: 1.0; Range: 0.0-3.0; Units: %; Status: F                                

      Test: BASO %; Value: 0.5; Range: 0.0-1.0; Units: %; Status: F                               

      Test: LARGE UNSTAINED CELL %; Value: 3.0; Range: 0.0-4.0; Units: %; Status: F               

      Test: NEUTROPHILS #; Value: 5.3; Range: 1.8-7.7; Units: K/mm3; Status: F                    

      Test: LYMPH #; Value: 2.5; Range: 1.5-6.5; Units: K/mm3; Status: F                          

      Test: MONO #; Value: 0.4; Range: 0.0-0.8; Units: K/mm3; Status: F                           

      Test: EOS #; Value: 0.1; Range: 0.0-0.50; Units: K/mm3; Status: F                           

      Test: BASO #; Value: 0.0; Range: 0.0-0.2; Units: K/mm3; Status: F                           

      Test: LARGE UNSTAINED CELL #; Value: 0.3; Range: 0.0-0.4; Units: K/mm3; Status: F           

Lab Order: Lipase; SPEC' 17 20:19                                                          

      Test: LIPASE; Value: 96; Range: ; Units: U/L; Status: F                               

Lab Order: Liver Profile; SPEC' 17 20:19                                                   

      Test: AST/SGOT; Value: 15; Range: 15-37; Units: U/L; Status: F                              

      Test: ALT/SGPT; Value: 20; Range: 12-78; Units: U/L; Status: F                              

      Test: ALKALINE PHOSPHATASE; Value: 66; Range: ; Units: U/L; Status: F                 

      Test: BILIRUBIN,TOTAL; Value: 0.2; Range: 0.2-1.0; Units: MG/DL; Status: F                  

      Test: BILIRUBIN,DIRECT; Value: < 0.1; Range: 0.0-0.2; Units: MG/DL; Status: F               

      Test: TOTAL PROTEIN; Value: 6.8; Range: 6.4-8.2; Units: GM/DL; Status: F                    

      Test: ALBUMIN; Value: 3.5; Range: 3.2-5.2; Units: GM/DL; Status: F                          

      Test: ALBUMIN/GLOBULIN RATIO; Value: 1.06; Range: 1.00-1.93; Status: F                      

                                                                                                  

Radiology Order: CT ABD & PELVIS: No Contrast                                                   

      Test: CT ABD & PELVIS: No Contrast                                                        

      REASON FOR EXAMINATION: right renal colic; ; CT of the abdomen and pelvis without           

      contrast; Clinical statement: Pain.; Technique: Multiple axial CT images were obtained      

      from the base of the lungs to the floor of the pelv; is utilizing 5 mm axial slices         

      without administration of contrast. Coronal and sagittal reconstructio; ns were also        

      obtained.; No comparison is available.; Findings:; Chest: The visualized lung bases are     

      clear.; Abdomen: The kidneys are normal in size bilaterally. There is moderate              

      right-sided hydronephrosis cau; sed by a 4 mm obstructing stone at the right                

      ureteropelvic junction. The liver, spleen, pancreas, gal; lbladder and adrenal glands       

      are unremarkable. The aorta demonstrates normal caliber and contour. Ther; e is no          

      abdominal lymphadenopathy or ascites.; Pelvis: The bowel is unremarkable, with no           

      obstructive or inflammatory changes. The appendix is simone; l. The urinary bladder is       

      within normal limits. There is no pelvic lymphadenopathy or ascites. The ot; her pelvic     

      structures appear unremarkable.; Bones: There are no suspicious osseous abnormalities       

      seen.; Impression: Mild right-sided hydronephrosis caused by a 4 mm obstructing stone       

      at the right ureterope; lvic junction.; Electronically signed by JODI CAMARILLO MD on     

      2017 10:05:26 PM ET;                                                                  

Outcome:                                                                                          

22:44 Discharge ordered by Provider.                                                          ar2 

23:05 Discharge Assessment: Patient awake, alert and oriented x 3. No cognitive and/or        ld5 

      functional deficits noted. Patient verbalized understanding of disposition                  

      instructions. patient administered narcotics - no. The following High Risk Discharge        

      criteria are identified: None. Discharged to home ambulatory, with friend. Condition:       

      stable. Discharge instructions given to patient, Instructed on discharge instructions,      

      follow up and referral plans. medication usage, no driving heavy equipment,                 

      Demonstrated understanding of instructions, medications, Pt was receptive of discharge      

      instructions/ teaching. Prescriptions given X 4. CT Study completed. Property :Personal     

      belongings accompany Pt.                                                                    

23:07 Patient left the ED.                                                                    ld5 

                                                                                                  

Addendum:                                                                                         

2017                                                                                        

     10:39 Narrative: Urine culture results reviewed with Dr. Griffin and Rx written for Keflex    
kcs

           500 mg po QID x 7 days. Message left for patient to call us concerning where she would 

           like Rx called to.                                                                     

                                                                                                  

Signatures:                                                                                       

Dispatcher [employer]                           EDMS                                                 

Stacy Hines RN RN kcs Zecher, Calvin, RN RN cz Barnhardt, Gloria, Babar                  Reg  Alvnio Ferrara Aaron, PA-C                 PA-C ar2                                                  

Itzel Mayen RN RN   ld5                                                  

Anuradha Whipple RN RN   [facility]                                                  

Kavon Scott                            gr2                                                  

Joan Nichols, PCA                   PCA  cln                                                  

Anuradha Whipple RN                               [facility]                                                  

                                                                                                  

**************************************************************************************************



*** Chart Complete ***
MTDD

## 2017-02-20 NOTE — EDDOCDS
Nurse's Notes                                                                                     

[facility]                                                                         

Name: Marjorie Graves                                                                             

Age: 25 yrs                                                                                       

Sex: Female                                                                                       

: 1991                                                                                   

MRN: [id number]                                                                                     

Arrival Date: 2017                                                                          

Time: 19:40                                                                                       

Account#: [id number]                                                                              

Bed I5 / M5                                                                                       

Private MD: Libertad                                                                       

Diagnosis: Hydronephrosis with renal and ureteral calculous obstruction-right 4mm UPJ stone with  

mild hydronephrosis                                                                             

                                                                                                  

Presentation:                                                                                     

                                                                                             

19:50 Presenting complaint: Patient states: stomach pain since earlier today pain back side   cz  

      and worse now pt denies UTI symptoms. Acute neurological deficits are not present.          

      Mechanism of Injury: No Mechanism of Injury. Adult Sepsis Screening: The patient does       

      not have new or worsening altered mentation. Patient's respiratory rate is less than        

      22. Systolic blood pressure is greater than 100. Patient has a qSOFA score of 0-            

      Negative Sepsis Screen. Suicide/Homicide risk assessment- the patient denies having any     

      suicidal and/or homicidal ideations and does not present with any other emotional,          

      behavioral or mental health complaints.  Status: The patient is a           

      dependent. Transition of care: patient was not received from another setting of care.       

19:50 Acuity: TANG Level 3                                                                     cz  

19:50 Method Of Arrival: Walkin/Carried/Asstd                                                 cz  

                                                                                                  

Triage Assessment:                                                                                

19:52 General: Appears uncomfortable. Pain: Location: back Pain currently is 8 out of 10 on a cz  

      pain scale. Pt Declines HIV testing.                                                        

                                                                                                  

OB/GYN:                                                                                           

19:52 no periods due to birth control pill                                                    cz  

                                                                                                  

Historical:                                                                                       

- Allergies: No known drug Allergies;                                                             

- Home Meds:                                                                                      

1. birth control                                                                                

- PMHx: none;                                                                                     

- PSHx: ;                                                                                

- Social history: Smoking status: Patient states was never smoker of tobacco. No                  

barriers to communication noted, The patient speaks fluent English, Speaks                      

appropriately for age.                                                                          

- Family history: Not pertinent.                                                                  

- : The pt / caregiver states he / she is not on anticoagulants. Home medication list             

is obtained from the patient.                                                                   

- Exposure Risk Screening:: None identified.                                                      

                                                                                                  

                                                                                                  

Screenin:29 Screening information is obtained from the patient. Fall risk: No risks identified.     cjh 

      Assistance ADL's: requires no assistance with activities of daily living. Abuse/DV          

      Screen: The patient / caregiver reports he/she is: not in a situation that causes fear,     

      pain or injury. Nutritional screening: No deficits noted. Advance Directives: There is      

      no active DNR order. home support is adequate.                                              

                                                                                                  

Assessment:                                                                                       

20:29 General: Appears in no apparent distress, comfortable, Behavior is appropriate for age, cjh 

      cooperative. Pain: Location: posterior aspect of right lateral abdomen and anterior         

      aspect of right lateral abdomen Pain currently is 8 out of 10 on a pain scale.              

      Respiratory: Airway is patent Respiratory effort is even, unlabored, Respiratory            

      pattern is regular, symmetrical. GI: Abdomen is non- distended Bowel sounds present X 4     

      quads. Abd is soft and non tender X 4 quads. Musculoskeletal: Range of motion intact in     

      all extremities.                                                                            

21:10 General: Pt laying in bed talking on phone. States "I feel so much better". Bolus       ld5 

      started per orders. Will continue to monitor.                                               

21:53 General: returned from CT, tolerated well, awaiting results, friends at bedside, will   [facility] 

      continue to monitor.                                                                        

22:36 General: Pt ambulated to bathroom. Tolerated well. Reports minimal pain. Resting        ld5 

      comfortably in bed. Friends at bedside. Will continue to monitor.                           

23:05 General: Appears in no apparent distress, Behavior is cooperative. Pain: Pain currently ld5 

      is 2 out of 10 on a pain scale. Neurological: Level of Consciousness is awake, alert.       

      Respiratory: Airway is patent Respiratory effort is even, unlabored.                        

                                                                                                  

Vital Signs:                                                                                      

19:42  / 71; Pulse 72; Resp 18 S; Temp 97.9(O); Pulse Ox 99% on R/A; Weight 53.52 kg    gr2 

      (R); Height 4 ft. 11 in. (149.86 cm) (R); Pain 9/10;                                        

23:05  / 71; Pulse 74; Resp 16; Temp 97.8; Pulse Ox 100% on R/A; Pain 2/10;             ld5 

19:42 Body Mass Index 23.83 (53.52 kg, 149.86 cm)                                             gr2 

                                                                                                  

Vitals:                                                                                           

19:42 Log In Time: 2017 at 19:42.                                                gr2 

                                                                                                  

ED Course:                                                                                        

19:42 Patient visited by Kavon Scott.                                                   gr2 

19:42 Libertad is Private Physician.                                                   gr2 

19:42 Patient moved to Waiting                                                                gr2 

19:44 Patient visited by Kavon Scott.                                                   gr2 

19:44 Patient moved to Pre RCE                                                                gr2 

19:51 Triage Initiated                                                                        cz  

19:55 [facility]                                                        ar2 

19:55 Patient moved to Triage 2                                                               cz  

19:56 David Rhoades DO is Attending Physician.                                            ar2 

19:56 Patient visited by Reji Rudolph PA-C.                                             ar2 

20:06 Patient moved to I5 / M5                                                                cz  

20:16 Patient visited by Joan Nichols PCA.                                               cln 

20:16 Urinalysis Sent.                                                                        cln 

20:16 Urine Culture Sent.                                                                     cln 

20:29 The patient / caregiver is instructed regarding the plan of care and ED course.         [facility] 

20:29 Inserted saline lock: 20 gauge in left antecubital area and blood collected. No         [facility] 

      procedures done that require assistance. Labs drawn. (by ED staff). Sent per order to       

      lab.                                                                                        

20:48 Patient visited by Itzel Mayen RN.                                                  ld5 

21:11 Patient visited by Itzel Mayen RN.                                                  ld5 

21:54 Patient visited by Anuradha Whipple RN.                                                      cjh 

22:12 CT ABD & PELVIS: No Contrast Returned.                                                  EDMS

22:25 [facility] Payment Agreement was scanned into [employer] and attached to record.               zo  

22:37 Patient visited by Itzel Mayen RN.                                                  ld5 

22:43 Jazmin Adkins MD is Referral Physician.                                              ar2 

23:05 Discontinued lock intact, bleeding controlled, pressure dressing applied, No            ld5 

      redness/swelling at site.                                                                   

23:07 Patient visited by Itzel Mayen RN.                                                  ld5 

                                                                                             

11:30 T-Sheet-- Draft Copy was scanned into [employer] and attached to record.                   gb  

11:30 Radiology Report was scanned into [employer] and attached to record.                       gb  

                                                                                                  

Administered Medications:                                                                         

                                                                                             

20:24 Drug: Ondansetron 4 mg [ondansetron HCl 2 mg/mL intravenous solution (2 mL)] Route:     ld5 

      IVP; Site: left antecubital;                                                                

23:05 Follow up: Response: Nausea is decreased                                                ld5 

20:48 Drug: ketorolac 30 mg [ketorolac 30 mg/mL (1 mL) injection solution (1 mL)] Route: IVP; ld5 

      Site: left antecubital;                                                                     

23:00 Follow up: Response: Med's dispensed home                                               ld5 

21:11 Drug: NS 0.9% 1000 ml [sodium chloride 0.9 % intravenous solution] Route: IV; Rate:     ld5 

      bolus; Site: left antecubital;                                                              

22:59 Follow up: IV Status: Completed infusion; IV Intake: 1000ml                             ld5 

22:59 Drug: HYDROcodone-acetaminophen 4 pack- 1 packets [hydrocodone 5 mg-acetaminophen 325   ld5 

      mg tablet (1 tabs)] {Co-Signature: [facility] (Anuradha Whipple RN).} Route: PO;                        

22:59 Follow up: Response: Med's dispensed home                                               ld5 

                                                                                                  

                                                                                                  

Point of Care Testing:                                                                            

      Urine Pregnancy:                                                                            

20:16 hCG Reading: Negative; Control Reading: Positive;                                       cln 

      Ranges:                                                                                     

                                                                                                  

Intake:                                                                                           

22:59 IV: 1000.00ml; Total: 1000.00ml.                                                        ld5 

                                                                                                  

Order Results:                                                                                    

Lab Order: Urine Culture; SPEC'M 17 19:58                                                   

      Test: URINE CULTURE; Value: <EXTERNAL COMMENT eCWMed> FULL REPORT IN LAB NOTES (eCW and     

      Medent).; Status: F                                                                         

      Test: URINE CULTURE; Value: ORGANISM 1: PROTEUS MIRABILIS; Status: F                        

      Test: URINE CULTURE; Value: PROTEUS MIRABILIS; Status: F                                    

      Test: URINE CULTURE; Value: COLONY COUNT CFU/ml >100,000; Status: F                         

      Test: URINE CULTURE; Value: GRAM NEG SENSI - VITEK 80; Status: F                            

      Test: URINE CULTURE; Value: Method: VIT2; Status: F                                         

      Test: URINE CULTURE; Value: TRIMETHOPRIM/SULFAMETHOXAZOLE >=320 R; Status: F                

      Test: URINE CULTURE; Value: AMPICILLIN <=2 S; Status: F                                     

      Test: URINE CULTURE; Value: GENTAMICIN <=1 S; Status: F                                     

      Test: URINE CULTURE; Value: NITROFURANTOIN 128 R; Status: F                                 

      Test: URINE CULTURE; Value: CEFAZOLIN <=4 S; Status: F                                      

      Test: URINE CULTURE; Value: LEVOFLOXACIN <=0.12 S; Status: F                                

      Test: URINE CULTURE; Value: TOBRAMYCIN <=1 S; Status: F                                     

      Test: URINE CULTURE; Value: CEFTRIAXONE <=1 S; Status: F                                    

      Test: URINE CULTURE; Value: CEFTAZIDIME <=1 S; Status: F                                    

      Test: URINE CULTURE; Value: AMPICILLIN/SULBACTAM <=2 S; Status: F                           

      Test: URINE CULTURE; Value: PIPERACILLIN/TAZOBACTAM <=4 S; Status: F                        

      Test: URINE CULTURE; Value: AZTREONAM <=1 S; Status: F                                      

      Test: URINE CULTURE; Value: ERTAPENEM <=0.5 S; Status: F                                    

      Test: URINE CULTURE; Value: MEROPENEM <=0.25 S; Status: F                                   

      Test: URINE CULTURE; Value: TIGECYCLINE 4 R; Status: F                                      

      Test: URINE CULTURE; Value: CEFEPIME <=1 S; Status: F                                       

Lab Order: Urinalysis; SPEC'M 17 19:58                                                      

      Test: APPEARANCE, URINE; Value: CLEAR; Range: CLEAR; Status: F                              

      Test: COLOR, URINE; Value: YELLOW; Range: YELLOW; Status: F                                 

      Test: PH,URINE; Value: 8.0; Range: 5.0-9.0; Units: UNITS; Status: F                         

      Test: SPECIFIC GRAVITY URINE AUTO; Value: 1.018; Range: 1.002-1.035; Status: F              

      Test: PROTEIN, URINE AUTO; Value: 1+; Range: NEGATIVE; Abnormal: Above high normal;         

      Units: mg/dL; Status: F                                                                     

      Test: GLUCOSE, URINE (UA) AUTO; Value: NEGATIVE; Range: NEGATIVE; Units: mg/dL; Status:     

      F                                                                                           

      Test: KETONE, URINE AUTO; Value: NEGATIVE; Range: NEGATIVE; Units: mg/dL; Status: F         

      Test: UROBILINOGEN, URINE AUTO; Value: 0.2; Range: 0.0-2.0; Units: mg/dL; Status: F         

      Test: BILIRUBIN, URINE AUTO; Value: NEGATIVE; Range: NEGATIVE; Status: F                    

      Test: NITRITE, URINE AUTO; Value: NEGATIVE; Range: NEGATIVE; Status: F                      

      Test: LEUKOCYTE ESTERASE, URINE AUTO; Value: TRACE; Range: NEGATIVE; Abnormal: Above        

      high normal; Status: F                                                                      

      Test: BLOOD, URINE BLOOD; Value: 3+; Range: NEGATIVE; Abnormal: Above high normal;          

      Status: F                                                                                   

      Test: WBC, URINE AUTO; Value: 13; Range: 0-3; Abnormal: Above high normal; Units: /HPF;     

      Status: F                                                                                   

      Test: RBC, URINE AUTO; Value: TNTC; Range: 0-3; Abnormal: Above high normal; Units:         

      /HPF; Status: F                                                                             

      Test: BACTERIA, URINE AUTO; Value: 1+; Range: NEGATIVE; Abnormal: Above high normal;        

      Status: F                                                                                   

      Test: SQUAMOUS EPITHELIAL CELL UR AU; Value: 1; Range: 0-6; Units: /HPF; Status: F          

      Test: MUCUS, URINE; Value: SMALL; Range: NEGATIVE; Status: F                                

      Test: HYALINE CAST, URINE AUTO; Value: 0; Range: 0-1; Units: /LPF; Status: F                

      Test: AMORPHOUS SEDIMENT; Value: SMALL; Range: NEGATIVE; Abnormal: Above high normal;       

      Status: F                                                                                   

Lab Order: Amylase; SPEC'17 20:19                                                         

      Test: AMYLASE; Value: 65; Range: ; Units: U/L; Status: F                              

Lab Order: Basic Metabolic Profile; SPEC'17 20:19                                         

      Test: GLUCOSE, FASTING; Value: 87; Range: ; Units: MG/DL; Status: F                   

      Test: BLOOD UREA NITROGEN; Value: 8; Range: 7-18; Units: MG/DL; Status: F                   

      Test: CREATININE FOR GFR; Value: 0.92; Range: 0.55-1.02; Units: MG/DL; Status: F            

      Test: GLOMERULAR FILTRATION RATE; Value: > 60.0; Range: >60; Status: F                      

      Test: SODIUM LEVEL; Value: 142; Range: 136-145; Units: MEQ/L; Status: F                     

      Test: POTASSIUM SERUM; Value: 3.8; Range: 3.5-5.1; Units: MEQ/L; Status: F                  

      Test: CHLORIDE LEVEL; Value: 106; Range: ; Units: MEQ/L; Status: F                    

      Test: CARBON DIOXIDE LEVEL; Value: 28; Range: 21-32; Units: MEQ/L; Status: F                

      Test: ANION GAP; Value: 8; Range: 8-16; Units: MEQ/L; Status: F                             

      Test: CALCIUM LEVEL; Value: 9.0; Range: 8.5-10.1; Units: MG/DL; Status: F                   

      Test Note: &nbsp;; Units are mL/min/1.73 m2 Chronic Kidney Disease Staging per NKF:       

      Stage I & II GFR >=60 Normal to Mildly Decreased Stage III GFR 30-59 Moderately           

      Decreased Stage IV GFR 15-29 Severely Decreased Stage V GFR <15 Very Little GFR Left        

      ESRD GFR <15 on RRT                                                                         

Lab Order: CBC with Diff; SPEC'17 20:19                                                   

      Test: WHITE BLOOD COUNT; Value: 8.6; Range: 4.0-10.0; Units: K/mm3; Status: F               

      Test: RED BLOOD COUNT; Value: 4.67; Range: 4.00-5.40; Units: M/mm3; Status: F               

      Test: HEMOGLOBIN; Value: 13.8; Range: 12.0-16.0; Units: g/dl; Status: F                     

      Test: HEMATOCRIT; Value: 42.3; Range: 36.0-47.0; Units: %; Status: F                        

      Test: MEAN CORPUSCULAR VOLUME; Value: 90.5; Range: 80.0-96.0; Units: fl; Status: F          

      Test: MEAN CORPUSCULAR HEMOGLOBIN; Value: 29.6; Range: 27.0-33.0; Units: pg; Status: F      

      Test: MEAN CORPUSCULAR HGB CONC; Value: 32.8; Range: 32.0-36.5; Units: g/dl; Status: F      

      Test: RED CELL DISTRIBUTION WIDTH; Value: 13.2; Range: 11.5-14.5; Units: %; Status: F       

      Test: PLATELET COUNT, AUTOMATED; Value: 208; Range: 150-450; Units: k/mm3; Status: F        

      Test: NEUTROPHILS %; Value: 61.4; Range: 36.0-66.0; Units: %; Status: F                     

      Test: LYMPH %; Value: 28.8; Range: 24.0-44.0; Units: %; Status: F                           

      Test: MONO %; Value: 5.1; Range: 0.0-5.0; Abnormal: Above high normal; Units: %;            

      Status: F                                                                                   

      Test: EOS %; Value: 1.0; Range: 0.0-3.0; Units: %; Status: F                                

      Test: BASO %; Value: 0.5; Range: 0.0-1.0; Units: %; Status: F                               

      Test: LARGE UNSTAINED CELL %; Value: 3.0; Range: 0.0-4.0; Units: %; Status: F               

      Test: NEUTROPHILS #; Value: 5.3; Range: 1.8-7.7; Units: K/mm3; Status: F                    

      Test: LYMPH #; Value: 2.5; Range: 1.5-6.5; Units: K/mm3; Status: F                          

      Test: MONO #; Value: 0.4; Range: 0.0-0.8; Units: K/mm3; Status: F                           

      Test: EOS #; Value: 0.1; Range: 0.0-0.50; Units: K/mm3; Status: F                           

      Test: BASO #; Value: 0.0; Range: 0.0-0.2; Units: K/mm3; Status: F                           

      Test: LARGE UNSTAINED CELL #; Value: 0.3; Range: 0.0-0.4; Units: K/mm3; Status: F           

Lab Order: Lipase; SPEC' 17 20:19                                                          

      Test: LIPASE; Value: 96; Range: ; Units: U/L; Status: F                               

Lab Order: Liver Profile; SPEC' 17 20:19                                                   

      Test: AST/SGOT; Value: 15; Range: 15-37; Units: U/L; Status: F                              

      Test: ALT/SGPT; Value: 20; Range: 12-78; Units: U/L; Status: F                              

      Test: ALKALINE PHOSPHATASE; Value: 66; Range: ; Units: U/L; Status: F                 

      Test: BILIRUBIN,TOTAL; Value: 0.2; Range: 0.2-1.0; Units: MG/DL; Status: F                  

      Test: BILIRUBIN,DIRECT; Value: < 0.1; Range: 0.0-0.2; Units: MG/DL; Status: F               

      Test: TOTAL PROTEIN; Value: 6.8; Range: 6.4-8.2; Units: GM/DL; Status: F                    

      Test: ALBUMIN; Value: 3.5; Range: 3.2-5.2; Units: GM/DL; Status: F                          

      Test: ALBUMIN/GLOBULIN RATIO; Value: 1.06; Range: 1.00-1.93; Status: F                      

                                                                                                  

Radiology Order: CT ABD & PELVIS: No Contrast                                                   

      Test: CT ABD & PELVIS: No Contrast                                                        

      REASON FOR EXAMINATION: right renal colic; ; CT of the abdomen and pelvis without           

      contrast; Clinical statement: Pain.; Technique: Multiple axial CT images were obtained      

      from the base of the lungs to the floor of the pelv; is utilizing 5 mm axial slices         

      without administration of contrast. Coronal and sagittal reconstructio; ns were also        

      obtained.; No comparison is available.; Findings:; Chest: The visualized lung bases are     

      clear.; Abdomen: The kidneys are normal in size bilaterally. There is moderate              

      right-sided hydronephrosis cau; sed by a 4 mm obstructing stone at the right                

      ureteropelvic junction. The liver, spleen, pancreas, gal; lbladder and adrenal glands       

      are unremarkable. The aorta demonstrates normal caliber and contour. Ther; e is no          

      abdominal lymphadenopathy or ascites.; Pelvis: The bowel is unremarkable, with no           

      obstructive or inflammatory changes. The appendix is simone; l. The urinary bladder is       

      within normal limits. There is no pelvic lymphadenopathy or ascites. The ot; her pelvic     

      structures appear unremarkable.; Bones: There are no suspicious osseous abnormalities       

      seen.; Impression: Mild right-sided hydronephrosis caused by a 4 mm obstructing stone       

      at the right ureterope; lvic junction.; Electronically signed by JODI CAMARILLO MD on     

      2017 10:05:26 PM ET;                                                                  

Outcome:                                                                                          

22:44 Discharge ordered by Provider.                                                          ar2 

23:05 Discharge Assessment: Patient awake, alert and oriented x 3. No cognitive and/or        ld5 

      functional deficits noted. Patient verbalized understanding of disposition                  

      instructions. patient administered narcotics - no. The following High Risk Discharge        

      criteria are identified: None. Discharged to home ambulatory, with friend. Condition:       

      stable. Discharge instructions given to patient, Instructed on discharge instructions,      

      follow up and referral plans. medication usage, no driving heavy equipment,                 

      Demonstrated understanding of instructions, medications, Pt was receptive of discharge      

      instructions/ teaching. Prescriptions given X 4. CT Study completed. Property :Personal     

      belongings accompany Pt.                                                                    

23:07 Patient left the ED.                                                                    ld5 

                                                                                                  

Addendum:                                                                                         

2017                                                                                        

     10:39 Narrative: Urine culture results reviewed with Dr. Griffin and Rx written for Keflex    
kcs

           500 mg po QID x 7 days. Message left for patient to call us concerning where she would 

           like Rx called to.                                                                     

     11:33 Narrative: Patient called back and requests [facility] in [place] - Rx called in.      
kcs

                                                                                                  

Signatures:                                                                                       

Dispatcher [employer]                           EDStacy Salguero RN                      RN   Juan C Toth RN RN   cz                                                   

Shahla Qureshi, Babar                  Reg  Alvino Ferrara Aaron, PA-C                 PA-PROMISE ar2                                                  

Mayen,Itzel,RN                      RN   ld5                                                  

Anuradha Whipple,RN                          RN   cj                                                  

Kavon Scott                            gr2                                                  

Simone, Joan, PCA                   PCA  cln                                                  

Anuradha Whipple RN                               [facility]                                                  

                                                                                                  

**************************************************************************************************



*** Chart Complete ***
MTDD

## 2017-02-20 NOTE — EDDOCDS
Physician Documentation                                                                           

[facility]                                                                         

Name: Marjorie Graves                                                                             

Age: 25 yrs                                                                                       

Sex: Female                                                                                       

: 1991                                                                                   

MRN: [id number]                                                                                     

Arrival Date: 2017                                                                          

Time: 19:40                                                                                       

Account#: [id number]                                                                              

Bed I5 / M5                                                                                       

Private MD: Libertad                                                                       

Disposition:                                                                                      

17 22:44 Discharged to Home/Self Care. Impression: Hydronephrosis with renal and            

ureteral calculous obstruction - right 4mm UPJ stone with mild hydronephrosis.                  

- Condition is Stable.                                                                            

- Discharge Instructions: Kidney Stones.                                                          

- Prescriptions for Ibuprofen 800 mg Oral Tablet - take 1 tablet by ORAL route every 8            

hours As needed take with food; 30 tablet. Norco 5- 325 mg Oral Tablet - take 1                 

tablet by ORAL route every 6 hours As needed MDD: 4 tabs; 16 tablet. Flomax 0.4 mg              

Oral Capsule, Sust. Release 24 hr - take 1 capsule by ORAL route once daily 1/2 hour            

following the same meal each day; 15 capsule. ZOFRAN ODT 4 mg Oral - dissolve 1                 

tablet by ORAL route every 8 hours As needed do not chew, do not swallow whole; 10              

tablet.                                                                                         

- Medication Reconciliation, Local Pharmacy Hours form.                                           

- Follow up: Jazmin Adkins MD; When: Call to arrange an appointment; Reason: Recheck           

today's complaints, Continuance of care. Follow up: Emergency Department; When: As              

needed; Reason: Worsening of conditions.                                                        

- Problem is new.                                                                                 

- Symptoms have improved.                                                                         

- Notes: call monday for follow up appointment. drink plenty of fluids                            

                                                                                                  

                                                                                                  

Historical:                                                                                       

- Allergies: No known drug Allergies;                                                             

- Home Meds:                                                                                      

1. birth control                                                                                

- PMHx: none;                                                                                     

- PSHx: ;                                                                                

- Social history: Smoking status: Patient states was never smoker of tobacco. No                  

barriers to communication noted, The patient speaks fluent English, Speaks                      

appropriately for age.                                                                          

- Family history: Not pertinent.                                                                  

- : The pt / caregiver states he / she is not on anticoagulants. Home medication list             

is obtained from the patient.                                                                   

- Exposure Risk Screening:: None identified.                                                      

                                                                                                  

                                                                                                  

OB/GYN:                                                                                           

                                                                                             

19:52 no periods due to birth control pill                                                    cz  

                                                                                                  

Vital Signs:                                                                                      

19:42  / 71; Pulse 72; Resp 18 S; Temp 97.9(O); Pulse Ox 99% on R/A; Weight 53.52 kg /  gr2 

      117.99 lbs (R); Height 4 ft. 11 in. (149.86 cm) (R); Pain 9/10;                             

23:05  / 71; Pulse 74; Resp 16; Temp 97.8; Pulse Ox 100% on R/A; Pain 2/10;             ld5 

19:42 Body Mass Index 23.83 (53.52 kg, 149.86 cm)                                             gr2 

                                                                                                  

MDM:                                                                                              

19:57 UCG by Nursing ordered.                                                                 cz  

19:58 Urinalysis Ordered.                                                                     EDMS

19:58 Urine Culture Ordered.                                                                  EDMS

20:03 Undress patient appropriately for examination ordered.                                  ar2 

20:03 IV Saline Lock ordered.                                                                 ar2 

20:03 Ondansetron 4 mg IVP once ordered.                                                      ar2 

20:05 Amylase Ordered.                                                                        EDMS

20:05 Basic Metabolic Profile Ordered.                                                        EDMS

20:05 CBC with Diff Ordered.                                                                  EDMS

20:05 Lipase Ordered.                                                                         EDMS

20:05 Liver Profile Ordered.                                                                  EDMS

20:06 NOTHING BY MOUTH+DIET ordered.                                                          EDMS

20:42 ketorolac 30 mg IVP once ordered.                                                       ar2 

20:52 Urinalysis Reviewed.                                                                    ar2 

20:52 CBC with Diff Reviewed.                                                                 ar2 

20:52 Amylase Reviewed.                                                                       ar2 

20:52 Basic Metabolic Profile Reviewed.                                                       ar2 

20:52 Lipase Reviewed.                                                                        ar2 

20:52 Liver Profile Reviewed.                                                                 ar2 

20:53 NS 0.9% 1000 ml IV at bolus once ordered.                                               ar2 

20:53 CT ABD & PELVIS: No Contrast Ordered.                                                   EDMS

21:48 Financial registration complete.                                                        zo  

22:25 [facility] Payment Agreement was scanned into [email or web address] and attached to record.               zo  

22:42 Dispense Urine Strainer ordered.                                                        ar2 

22:47 HYDROcodone-acetaminophen 4 pack- 5 mg-325 mg 1 packets PO Per package directions;      ar2 

      Dispense with patient. 1 po q4h prn for pain ordered.                                       

22:48 CT ABD & PELVIS: No Contrast Reviewed.                                                  ar2

                                                                                             

11:30 T-Sheet-- Draft Copy was scanned into [email or web address] and attached to record.                   gb  

11:30 Radiology Report was scanned into [email or web address] and attached to record.                       gb  

                                                                                                  

Point of Care Testing:                                                                            

      Urine Pregnancy:                                                                            

                                                                                             

20:16 hCG Reading: Negative; Control Reading: Positive;                                       cln 

      Ranges:                                                                                     

                                                                                                  

Administered Medications:                                                                         

20:24 Drug: Ondansetron 4 mg [ondansetron HCl 2 mg/mL intravenous solution (2 mL)] Route:     ld5 

      IVP; Site: left antecubital;                                                                

23:05 Follow up: Response: Nausea is decreased                                                ld5 

20:48 Drug: ketorolac 30 mg [ketorolac 30 mg/mL (1 mL) injection solution (1 mL)] Route: IVP; ld5 

      Site: left antecubital;                                                                     

23:00 Follow up: Response: Med's dispensed home                                               ld5 

21:11 Drug: NS 0.9% 1000 ml [sodium chloride 0.9 % intravenous solution] Route: IV; Rate:     ld5 

      bolus; Site: left antecubital;                                                              

22:59 Follow up: IV Status: Completed infusion; IV Intake: 1000ml                             ld5 

22:59 Drug: HYDROcodone-acetaminophen 4 pack- 1 packets [hydrocodone 5 mg-acetaminophen 325   ld5 

      mg tablet (1 tabs)] {Co-Signature: [facility] (Anuradha Whipple RN).} Route: PO;                        

22:59 Follow up: Response: Med's dispensed home                                               ld5 

                                                                                                  

                                                                                                  

Signatures:                                                                                       

Dispatcher [employer]                           EDMS                                                 

Juan C Carr RN RN   cz                                                   

Shahla Qureshi, Reg                  Reg  gb                                                   

Alvino Machuca Aaron, PA-C PA-C ar2                                                  

Itzel Mayen RN RN   [facility]                                                  

Anuradha Whipple RN RN   [facility]                                                  

Anuradha Whipple RN                               [facility]                                                  

                                                                                                  

The chart was reviewed and I authenticate all verbal orders and agree with the evaluation and 
treatment provided.Attachments:

22:25 [facility] Payment Agreement                                                                zo  

                                                                                             

11:30 T-Sheet-- Draft Copy                                                                    gb  

                                                                                                  

**************************************************************************************************



*** Chart Complete ***
MTDD

## 2017-02-20 NOTE — EDDOCDS
Physician Documentation                                                                           

[facility]                                                                         

Name: Marjorie Graves                                                                             

Age: 25 yrs                                                                                       

Sex: Female                                                                                       

: 1991                                                                                   

MRN: [id number]                                                                                     

Arrival Date: 2017                                                                          

Time: 19:40                                                                                       

Account#: [id number]                                                                              

Bed I5 / M5                                                                                       

Private MD: [facility]                                                                       

Disposition:                                                                                      

17 22:44 Discharged to Home/Self Care. Impression: Hydronephrosis with renal and            

ureteral calculous obstruction - right 4mm UPJ stone with mild hydronephrosis.                  

- Condition is Stable.                                                                            

- Discharge Instructions: Kidney Stones.                                                          

- Prescriptions for Ibuprofen 800 mg Oral Tablet - take 1 tablet by ORAL route every 8            

hours As needed take with food; 30 tablet. Norco 5- 325 mg Oral Tablet - take 1                 

tablet by ORAL route every 6 hours As needed MDD: 4 tabs; 16 tablet. Flomax 0.4 mg              

Oral Capsule, Sust. Release 24 hr - take 1 capsule by ORAL route once daily 1/2 hour            

following the same meal each day; 15 capsule. ZOFRAN ODT 4 mg Oral - dissolve 1                 

tablet by ORAL route every 8 hours As needed do not chew, do not swallow whole; 10              

tablet.                                                                                         

- Medication Reconciliation, Local Pharmacy Hours form.                                           

- Follow up: Jazmin Adkins MD; When: Call to arrange an appointment; Reason: Recheck           

today's complaints, Continuance of care. Follow up: Emergency Department; When: As              

needed; Reason: Worsening of conditions.                                                        

- Problem is new.                                                                                 

- Symptoms have improved.                                                                         

- Notes: call monday for follow up appointment. drink plenty of fluids                            

                                                                                                  

                                                                                                  

Historical:                                                                                       

- Allergies: No known drug Allergies;                                                             

- Home Meds:                                                                                      

1. birth control                                                                                

- PMHx: none;                                                                                     

- PSHx: ;                                                                                

- Social history: Smoking status: Patient states was never smoker of tobacco. No                  

barriers to communication noted, The patient speaks fluent English, Speaks                      

appropriately for age.                                                                          

- Family history: Not pertinent.                                                                  

- : The pt / caregiver states he / she is not on anticoagulants. Home medication list             

is obtained from the patient.                                                                   

- Exposure Risk Screening:: None identified.                                                      

                                                                                                  

                                                                                                  

OB/GYN:                                                                                           

                                                                                             

19:52 no periods due to birth control pill                                                    cz  

                                                                                                  

Vital Signs:                                                                                      

19:42  / 71; Pulse 72; Resp 18 S; Temp 97.9(O); Pulse Ox 99% on R/A; Weight 53.52 kg /  gr2 

      117.99 lbs (R); Height 4 ft. 11 in. (149.86 cm) (R); Pain 9/10;                             

23:05  / 71; Pulse 74; Resp 16; Temp 97.8; Pulse Ox 100% on R/A; Pain 2/10;             ld5 

19:42 Body Mass Index 23.83 (53.52 kg, 149.86 cm)                                             gr2 

                                                                                                  

MDM:                                                                                              

19:57 UCG by Nursing ordered.                                                                 cz  

19:58 Urinalysis Ordered.                                                                     EDMS

19:58 Urine Culture Ordered.                                                                  EDMS

20:03 Undress patient appropriately for examination ordered.                                  ar2 

20:03 IV Saline Lock ordered.                                                                 ar2 

20:03 Ondansetron 4 mg IVP once ordered.                                                      ar2 

20:05 Amylase Ordered.                                                                        EDMS

20:05 Basic Metabolic Profile Ordered.                                                        EDMS

20:05 CBC with Diff Ordered.                                                                  EDMS

20:05 Lipase Ordered.                                                                         EDMS

20:05 Liver Profile Ordered.                                                                  EDMS

20:06 NOTHING BY MOUTH+DIET ordered.                                                          EDMS

20:42 ketorolac 30 mg IVP once ordered.                                                       ar2 

20:52 Urinalysis Reviewed.                                                                    ar2 

20:52 CBC with Diff Reviewed.                                                                 ar2 

20:52 Amylase Reviewed.                                                                       ar2 

20:52 Basic Metabolic Profile Reviewed.                                                       ar2 

20:52 Lipase Reviewed.                                                                        ar2 

20:52 Liver Profile Reviewed.                                                                 ar2 

20:53 NS 0.9% 1000 ml IV at bolus once ordered.                                               ar2 

20:53 CT ABD & PELVIS: No Contrast Ordered.                                                   EDMS

21:48 Financial registration complete.                                                        zo  

22:25 [facility] Payment Agreement was scanned into [employer] and attached to record.               zo  

22:42 Dispense Urine Strainer ordered.                                                        ar2 

22:47 HYDROcodone-acetaminophen 4 pack- 5 mg-325 mg 1 packets PO Per package directions;      ar2 

      Dispense with patient. 1 po q4h prn for pain ordered.                                       

22:48 CT ABD & PELVIS: No Contrast Reviewed.                                                  ar2

                                                                                             

11:30 T-Sheet-- Draft Copy was scanned into [employer] and attached to record.                   gb  

11:30 Radiology Report was scanned into [employer] and attached to record.                       gb  

                                                                                                  

Point of Care Testing:                                                                            

      Urine Pregnancy:                                                                            

                                                                                             

20:16 hCG Reading: Negative; Control Reading: Positive;                                       cln 

      Ranges:                                                                                     

                                                                                                  

Administered Medications:                                                                         

20:24 Drug: Ondansetron 4 mg [ondansetron HCl 2 mg/mL intravenous solution (2 mL)] Route:     ld5 

      IVP; Site: left antecubital;                                                                

23:05 Follow up: Response: Nausea is decreased                                                ld5 

20:48 Drug: ketorolac 30 mg [ketorolac 30 mg/mL (1 mL) injection solution (1 mL)] Route: IVP; ld5 

      Site: left antecubital;                                                                     

23:00 Follow up: Response: Med's dispensed home                                               ld5 

21:11 Drug: NS 0.9% 1000 ml [sodium chloride 0.9 % intravenous solution] Route: IV; Rate:     ld5 

      bolus; Site: left antecubital;                                                              

22:59 Follow up: IV Status: Completed infusion; IV Intake: 1000ml                             ld5 

22:59 Drug: HYDROcodone-acetaminophen 4 pack- 1 packets [hydrocodone 5 mg-acetaminophen 325   ld5 

      mg tablet (1 tabs)] {Co-Signature: [facility] (Anuradha Whipple RN).} Route: PO;                        

22:59 Follow up: Response: Med's dispensed home                                               ld5 

                                                                                                  

                                                                                                  

Signatures:                                                                                       

Dispatcher [employer]                           EDMS                                                 

Juan C Carr RN RN   cz                                                   

Shahla Qureshi, Reg                  Reg  gb                                                   

Alvino Machuca Aaron, PA-C PA-C ar2                                                  

Itzel Mayen RN RN   [facility]                                                  

Anuradha Whipple RN RN   [facility]                                                  

Anuradha Whipple RN                               [facility]                                                  

                                                                                                  

The chart was reviewed and I authenticate all verbal orders and agree with the evaluation and 
treatment provided.Attachments:

22:25 [facility] Payment Agreement                                                                zo  

                                                                                             

11:30 T-Sheet-- Draft Copy                                                                    gb  

                                                                                                  

**************************************************************************************************



*** Chart Complete ***
MTDD

## 2017-02-20 NOTE — EDDOCDS
Nurse's Notes                                                                                     

[facility]                                                                         

Name: Marjorie Graves                                                                             

Age: 25 yrs                                                                                       

Sex: Female                                                                                       

: 1991                                                                                   

MRN: [id number]                                                                                     

Arrival Date: 2017                                                                          

Time: 19:40                                                                                       

Account#: [id number]                                                                              

Bed I5 / M5                                                                                       

Private MD: Libertad                                                                       

Diagnosis: Hydronephrosis with renal and ureteral calculous obstruction-right 4mm UPJ stone with  

mild hydronephrosis                                                                             

                                                                                                  

Presentation:                                                                                     

                                                                                             

19:50 Presenting complaint: Patient states: stomach pain since earlier today pain back side   cz  

      and worse now pt denies UTI symptoms. Acute neurological deficits are not present.          

      Mechanism of Injury: No Mechanism of Injury. Adult Sepsis Screening: The patient does       

      not have new or worsening altered mentation. Patient's respiratory rate is less than        

      22. Systolic blood pressure is greater than 100. Patient has a qSOFA score of 0-            

      Negative Sepsis Screen. Suicide/Homicide risk assessment- the patient denies having any     

      suicidal and/or homicidal ideations and does not present with any other emotional,          

      behavioral or mental health complaints.  Status: The patient is a           

      dependent. Transition of care: patient was not received from another setting of care.       

19:50 Acuity: TANG Level 3                                                                     cz  

19:50 Method Of Arrival: Walkin/Carried/Asstd                                                 cz  

                                                                                                  

Triage Assessment:                                                                                

19:52 General: Appears uncomfortable. Pain: Location: back Pain currently is 8 out of 10 on a cz  

      pain scale. Pt Declines HIV testing.                                                        

                                                                                                  

OB/GYN:                                                                                           

19:52 no periods due to birth control pill                                                    cz  

                                                                                                  

Historical:                                                                                       

- Allergies: No known drug Allergies;                                                             

- Home Meds:                                                                                      

1. birth control                                                                                

- PMHx: none;                                                                                     

- PSHx: ;                                                                                

- Social history: Smoking status: Patient states was never smoker of tobacco. No                  

barriers to communication noted, The patient speaks fluent English, Speaks                      

appropriately for age.                                                                          

- Family history: Not pertinent.                                                                  

- : The pt / caregiver states he / she is not on anticoagulants. Home medication list             

is obtained from the patient.                                                                   

- Exposure Risk Screening:: None identified.                                                      

                                                                                                  

                                                                                                  

Screenin:29 Screening information is obtained from the patient. Fall risk: No risks identified.     cjh 

      Assistance ADL's: requires no assistance with activities of daily living. Abuse/DV          

      Screen: The patient / caregiver reports he/she is: not in a situation that causes fear,     

      pain or injury. Nutritional screening: No deficits noted. Advance Directives: There is      

      no active DNR order. home support is adequate.                                              

                                                                                                  

Assessment:                                                                                       

20:29 General: Appears in no apparent distress, comfortable, Behavior is appropriate for age, cjh 

      cooperative. Pain: Location: posterior aspect of right lateral abdomen and anterior         

      aspect of right lateral abdomen Pain currently is 8 out of 10 on a pain scale.              

      Respiratory: Airway is patent Respiratory effort is even, unlabored, Respiratory            

      pattern is regular, symmetrical. GI: Abdomen is non- distended Bowel sounds present X 4     

      quads. Abd is soft and non tender X 4 quads. Musculoskeletal: Range of motion intact in     

      all extremities.                                                                            

21:10 General: Pt laying in bed talking on phone. States "I feel so much better". Bolus       ld5 

      started per orders. Will continue to monitor.                                               

21:53 General: returned from CT, tolerated well, awaiting results, friends at bedside, will   [facility] 

      continue to monitor.                                                                        

22:36 General: Pt ambulated to bathroom. Tolerated well. Reports minimal pain. Resting        ld5 

      comfortably in bed. Friends at bedside. Will continue to monitor.                           

23:05 General: Appears in no apparent distress, Behavior is cooperative. Pain: Pain currently ld5 

      is 2 out of 10 on a pain scale. Neurological: Level of Consciousness is awake, alert.       

      Respiratory: Airway is patent Respiratory effort is even, unlabored.                        

                                                                                                  

Vital Signs:                                                                                      

19:42  / 71; Pulse 72; Resp 18 S; Temp 97.9(O); Pulse Ox 99% on R/A; Weight 53.52 kg    gr2 

      (R); Height 4 ft. 11 in. (149.86 cm) (R); Pain 9/10;                                        

23:05  / 71; Pulse 74; Resp 16; Temp 97.8; Pulse Ox 100% on R/A; Pain 2/10;             ld5 

19:42 Body Mass Index 23.83 (53.52 kg, 149.86 cm)                                             gr2 

                                                                                                  

Vitals:                                                                                           

19:42 Log In Time: 2017 at 19:42.                                                gr2 

                                                                                                  

ED Course:                                                                                        

19:42 Patient visited by Kavon Scott.                                                   gr2 

19:42 Libertad is Private Physician.                                                   gr2 

19:42 Patient moved to Waiting                                                                gr2 

19:44 Patient visited by Kavon Scott.                                                   gr2 

19:44 Patient moved to Pre RCE                                                                gr2 

19:51 Triage Initiated                                                                        cz  

19:55 [facility]                                                        ar2 

19:55 Patient moved to Triage 2                                                               cz  

19:56 David Rhoades DO is Attending Physician.                                            ar2 

19:56 Patient visited by Reji Rudolph PA-C.                                             ar2 

20:06 Patient moved to I5 / M5                                                                cz  

20:16 Patient visited by Joan Nichols PCA.                                               cln 

20:16 Urinalysis Sent.                                                                        cln 

20:16 Urine Culture Sent.                                                                     cln 

20:29 The patient / caregiver is instructed regarding the plan of care and ED course.         [facility] 

20:29 Inserted saline lock: 20 gauge in left antecubital area and blood collected. No         [facility] 

      procedures done that require assistance. Labs drawn. (by ED staff). Sent per order to       

      lab.                                                                                        

20:48 Patient visited by Itzel Mayen RN.                                                  ld5 

21:11 Patient visited by Itzel Mayen RN.                                                  ld5 

21:54 Patient visited by Anuradha Whipple RN.                                                      cjh 

22:12 CT ABD & PELVIS: No Contrast Returned.                                                  EDMS

22:25 [facility] Payment Agreement was scanned into [employer] and attached to record.               zo  

22:37 Patient visited by Itzel Mayen RN.                                                  ld5 

22:43 Jazmin Adkins MD is Referral Physician.                                              ar2 

23:05 Discontinued lock intact, bleeding controlled, pressure dressing applied, No            ld5 

      redness/swelling at site.                                                                   

23:07 Patient visited by Itzel Mayen RN.                                                  ld5 

                                                                                             

11:30 T-Sheet-- Draft Copy was scanned into [employer] and attached to record.                   gb  

11:30 Radiology Report was scanned into [employer] and attached to record.                       gb  

                                                                                                  

Administered Medications:                                                                         

                                                                                             

20:24 Drug: Ondansetron 4 mg [ondansetron HCl 2 mg/mL intravenous solution (2 mL)] Route:     ld5 

      IVP; Site: left antecubital;                                                                

23:05 Follow up: Response: Nausea is decreased                                                ld5 

20:48 Drug: ketorolac 30 mg [ketorolac 30 mg/mL (1 mL) injection solution (1 mL)] Route: IVP; ld5 

      Site: left antecubital;                                                                     

23:00 Follow up: Response: Med's dispensed home                                               ld5 

21:11 Drug: NS 0.9% 1000 ml [sodium chloride 0.9 % intravenous solution] Route: IV; Rate:     ld5 

      bolus; Site: left antecubital;                                                              

22:59 Follow up: IV Status: Completed infusion; IV Intake: 1000ml                             ld5 

22:59 Drug: HYDROcodone-acetaminophen 4 pack- 1 packets [hydrocodone 5 mg-acetaminophen 325   ld5 

      mg tablet (1 tabs)] {Co-Signature: [facility] (Anuradha Whipple RN).} Route: PO;                        

22:59 Follow up: Response: Med's dispensed home                                               ld5 

                                                                                                  

                                                                                                  

Point of Care Testing:                                                                            

      Urine Pregnancy:                                                                            

20:16 hCG Reading: Negative; Control Reading: Positive;                                       cln 

      Ranges:                                                                                     

                                                                                                  

Intake:                                                                                           

22:59 IV: 1000.00ml; Total: 1000.00ml.                                                        ld5 

                                                                                                  

Order Results:                                                                                    

Lab Order: Urine Culture; SPEC'M 17 19:58                                                   

      Test: URINE CULTURE; Value: <EXTERNAL COMMENT eCWMed> FULL REPORT IN LAB NOTES (eCW and     

      Medent).; Status: F                                                                         

      Test: URINE CULTURE; Value: ORGANISM 1: PROTEUS MIRABILIS; Status: F                        

      Test: URINE CULTURE; Value: PROTEUS MIRABILIS; Status: F                                    

      Test: URINE CULTURE; Value: COLONY COUNT CFU/ml >100,000; Status: F                         

      Test: URINE CULTURE; Value: GRAM NEG SENSI - VITEK 80; Status: F                            

      Test: URINE CULTURE; Value: Method: VIT2; Status: F                                         

      Test: URINE CULTURE; Value: TRIMETHOPRIM/SULFAMETHOXAZOLE >=320 R; Status: F                

      Test: URINE CULTURE; Value: AMPICILLIN <=2 S; Status: F                                     

      Test: URINE CULTURE; Value: GENTAMICIN <=1 S; Status: F                                     

      Test: URINE CULTURE; Value: NITROFURANTOIN 128 R; Status: F                                 

      Test: URINE CULTURE; Value: CEFAZOLIN <=4 S; Status: F                                      

      Test: URINE CULTURE; Value: LEVOFLOXACIN <=0.12 S; Status: F                                

      Test: URINE CULTURE; Value: TOBRAMYCIN <=1 S; Status: F                                     

      Test: URINE CULTURE; Value: CEFTRIAXONE <=1 S; Status: F                                    

      Test: URINE CULTURE; Value: CEFTAZIDIME <=1 S; Status: F                                    

      Test: URINE CULTURE; Value: AMPICILLIN/SULBACTAM <=2 S; Status: F                           

      Test: URINE CULTURE; Value: PIPERACILLIN/TAZOBACTAM <=4 S; Status: F                        

      Test: URINE CULTURE; Value: AZTREONAM <=1 S; Status: F                                      

      Test: URINE CULTURE; Value: ERTAPENEM <=0.5 S; Status: F                                    

      Test: URINE CULTURE; Value: MEROPENEM <=0.25 S; Status: F                                   

      Test: URINE CULTURE; Value: TIGECYCLINE 4 R; Status: F                                      

      Test: URINE CULTURE; Value: CEFEPIME <=1 S; Status: F                                       

Lab Order: Urinalysis; SPEC'M 17 19:58                                                      

      Test: APPEARANCE, URINE; Value: CLEAR; Range: CLEAR; Status: F                              

      Test: COLOR, URINE; Value: YELLOW; Range: YELLOW; Status: F                                 

      Test: PH,URINE; Value: 8.0; Range: 5.0-9.0; Units: UNITS; Status: F                         

      Test: SPECIFIC GRAVITY URINE AUTO; Value: 1.018; Range: 1.002-1.035; Status: F              

      Test: PROTEIN, URINE AUTO; Value: 1+; Range: NEGATIVE; Abnormal: Above high normal;         

      Units: mg/dL; Status: F                                                                     

      Test: GLUCOSE, URINE (UA) AUTO; Value: NEGATIVE; Range: NEGATIVE; Units: mg/dL; Status:     

      F                                                                                           

      Test: KETONE, URINE AUTO; Value: NEGATIVE; Range: NEGATIVE; Units: mg/dL; Status: F         

      Test: UROBILINOGEN, URINE AUTO; Value: 0.2; Range: 0.0-2.0; Units: mg/dL; Status: F         

      Test: BILIRUBIN, URINE AUTO; Value: NEGATIVE; Range: NEGATIVE; Status: F                    

      Test: NITRITE, URINE AUTO; Value: NEGATIVE; Range: NEGATIVE; Status: F                      

      Test: LEUKOCYTE ESTERASE, URINE AUTO; Value: TRACE; Range: NEGATIVE; Abnormal: Above        

      high normal; Status: F                                                                      

      Test: BLOOD, URINE BLOOD; Value: 3+; Range: NEGATIVE; Abnormal: Above high normal;          

      Status: F                                                                                   

      Test: WBC, URINE AUTO; Value: 13; Range: 0-3; Abnormal: Above high normal; Units: /HPF;     

      Status: F                                                                                   

      Test: RBC, URINE AUTO; Value: TNTC; Range: 0-3; Abnormal: Above high normal; Units:         

      /HPF; Status: F                                                                             

      Test: BACTERIA, URINE AUTO; Value: 1+; Range: NEGATIVE; Abnormal: Above high normal;        

      Status: F                                                                                   

      Test: SQUAMOUS EPITHELIAL CELL UR AU; Value: 1; Range: 0-6; Units: /HPF; Status: F          

      Test: MUCUS, URINE; Value: SMALL; Range: NEGATIVE; Status: F                                

      Test: HYALINE CAST, URINE AUTO; Value: 0; Range: 0-1; Units: /LPF; Status: F                

      Test: AMORPHOUS SEDIMENT; Value: SMALL; Range: NEGATIVE; Abnormal: Above high normal;       

      Status: F                                                                                   

Lab Order: Amylase; SPEC'17 20:19                                                         

      Test: AMYLASE; Value: 65; Range: ; Units: U/L; Status: F                              

Lab Order: Basic Metabolic Profile; SPEC'17 20:19                                         

      Test: GLUCOSE, FASTING; Value: 87; Range: ; Units: MG/DL; Status: F                   

      Test: BLOOD UREA NITROGEN; Value: 8; Range: 7-18; Units: MG/DL; Status: F                   

      Test: CREATININE FOR GFR; Value: 0.92; Range: 0.55-1.02; Units: MG/DL; Status: F            

      Test: GLOMERULAR FILTRATION RATE; Value: > 60.0; Range: >60; Status: F                      

      Test: SODIUM LEVEL; Value: 142; Range: 136-145; Units: MEQ/L; Status: F                     

      Test: POTASSIUM SERUM; Value: 3.8; Range: 3.5-5.1; Units: MEQ/L; Status: F                  

      Test: CHLORIDE LEVEL; Value: 106; Range: ; Units: MEQ/L; Status: F                    

      Test: CARBON DIOXIDE LEVEL; Value: 28; Range: 21-32; Units: MEQ/L; Status: F                

      Test: ANION GAP; Value: 8; Range: 8-16; Units: MEQ/L; Status: F                             

      Test: CALCIUM LEVEL; Value: 9.0; Range: 8.5-10.1; Units: MG/DL; Status: F                   

      Test Note: &nbsp;; Units are mL/min/1.73 m2 Chronic Kidney Disease Staging per NKF:       

      Stage I & II GFR >=60 Normal to Mildly Decreased Stage III GFR 30-59 Moderately           

      Decreased Stage IV GFR 15-29 Severely Decreased Stage V GFR <15 Very Little GFR Left        

      ESRD GFR <15 on RRT                                                                         

Lab Order: CBC with Diff; SPEC'17 20:19                                                   

      Test: WHITE BLOOD COUNT; Value: 8.6; Range: 4.0-10.0; Units: K/mm3; Status: F               

      Test: RED BLOOD COUNT; Value: 4.67; Range: 4.00-5.40; Units: M/mm3; Status: F               

      Test: HEMOGLOBIN; Value: 13.8; Range: 12.0-16.0; Units: g/dl; Status: F                     

      Test: HEMATOCRIT; Value: 42.3; Range: 36.0-47.0; Units: %; Status: F                        

      Test: MEAN CORPUSCULAR VOLUME; Value: 90.5; Range: 80.0-96.0; Units: fl; Status: F          

      Test: MEAN CORPUSCULAR HEMOGLOBIN; Value: 29.6; Range: 27.0-33.0; Units: pg; Status: F      

      Test: MEAN CORPUSCULAR HGB CONC; Value: 32.8; Range: 32.0-36.5; Units: g/dl; Status: F      

      Test: RED CELL DISTRIBUTION WIDTH; Value: 13.2; Range: 11.5-14.5; Units: %; Status: F       

      Test: PLATELET COUNT, AUTOMATED; Value: 208; Range: 150-450; Units: k/mm3; Status: F        

      Test: NEUTROPHILS %; Value: 61.4; Range: 36.0-66.0; Units: %; Status: F                     

      Test: LYMPH %; Value: 28.8; Range: 24.0-44.0; Units: %; Status: F                           

      Test: MONO %; Value: 5.1; Range: 0.0-5.0; Abnormal: Above high normal; Units: %;            

      Status: F                                                                                   

      Test: EOS %; Value: 1.0; Range: 0.0-3.0; Units: %; Status: F                                

      Test: BASO %; Value: 0.5; Range: 0.0-1.0; Units: %; Status: F                               

      Test: LARGE UNSTAINED CELL %; Value: 3.0; Range: 0.0-4.0; Units: %; Status: F               

      Test: NEUTROPHILS #; Value: 5.3; Range: 1.8-7.7; Units: K/mm3; Status: F                    

      Test: LYMPH #; Value: 2.5; Range: 1.5-6.5; Units: K/mm3; Status: F                          

      Test: MONO #; Value: 0.4; Range: 0.0-0.8; Units: K/mm3; Status: F                           

      Test: EOS #; Value: 0.1; Range: 0.0-0.50; Units: K/mm3; Status: F                           

      Test: BASO #; Value: 0.0; Range: 0.0-0.2; Units: K/mm3; Status: F                           

      Test: LARGE UNSTAINED CELL #; Value: 0.3; Range: 0.0-0.4; Units: K/mm3; Status: F           

Lab Order: Lipase; SPEC' 17 20:19                                                          

      Test: LIPASE; Value: 96; Range: ; Units: U/L; Status: F                               

Lab Order: Liver Profile; SPEC' 17 20:19                                                   

      Test: AST/SGOT; Value: 15; Range: 15-37; Units: U/L; Status: F                              

      Test: ALT/SGPT; Value: 20; Range: 12-78; Units: U/L; Status: F                              

      Test: ALKALINE PHOSPHATASE; Value: 66; Range: ; Units: U/L; Status: F                 

      Test: BILIRUBIN,TOTAL; Value: 0.2; Range: 0.2-1.0; Units: MG/DL; Status: F                  

      Test: BILIRUBIN,DIRECT; Value: < 0.1; Range: 0.0-0.2; Units: MG/DL; Status: F               

      Test: TOTAL PROTEIN; Value: 6.8; Range: 6.4-8.2; Units: GM/DL; Status: F                    

      Test: ALBUMIN; Value: 3.5; Range: 3.2-5.2; Units: GM/DL; Status: F                          

      Test: ALBUMIN/GLOBULIN RATIO; Value: 1.06; Range: 1.00-1.93; Status: F                      

                                                                                                  

Radiology Order: CT ABD & PELVIS: No Contrast                                                   

      Test: CT ABD & PELVIS: No Contrast                                                        

      REASON FOR EXAMINATION: right renal colic; ; CT of the abdomen and pelvis without           

      contrast; Clinical statement: Pain.; Technique: Multiple axial CT images were obtained      

      from the base of the lungs to the floor of the pelv; is utilizing 5 mm axial slices         

      without administration of contrast. Coronal and sagittal reconstructio; ns were also        

      obtained.; No comparison is available.; Findings:; Chest: The visualized lung bases are     

      clear.; Abdomen: The kidneys are normal in size bilaterally. There is moderate              

      right-sided hydronephrosis cau; sed by a 4 mm obstructing stone at the right                

      ureteropelvic junction. The liver, spleen, pancreas, gal; lbladder and adrenal glands       

      are unremarkable. The aorta demonstrates normal caliber and contour. Ther; e is no          

      abdominal lymphadenopathy or ascites.; Pelvis: The bowel is unremarkable, with no           

      obstructive or inflammatory changes. The appendix is simone; l. The urinary bladder is       

      within normal limits. There is no pelvic lymphadenopathy or ascites. The ot; her pelvic     

      structures appear unremarkable.; Bones: There are no suspicious osseous abnormalities       

      seen.; Impression: Mild right-sided hydronephrosis caused by a 4 mm obstructing stone       

      at the right ureterope; lvic junction.; Electronically signed by JODI CAMARILLO MD on     

      2017 10:05:26 PM ET;                                                                  

Outcome:                                                                                          

22:44 Discharge ordered by Provider.                                                          ar2 

23:05 Discharge Assessment: Patient awake, alert and oriented x 3. No cognitive and/or        ld5 

      functional deficits noted. Patient verbalized understanding of disposition                  

      instructions. patient administered narcotics - no. The following High Risk Discharge        

      criteria are identified: None. Discharged to home ambulatory, with friend. Condition:       

      stable. Discharge instructions given to patient, Instructed on discharge instructions,      

      follow up and referral plans. medication usage, no driving heavy equipment,                 

      Demonstrated understanding of instructions, medications, Pt was receptive of discharge      

      instructions/ teaching. Prescriptions given X 4. CT Study completed. Property :Personal     

      belongings accompany Pt.                                                                    

23:07 Patient left the ED.                                                                    ld5 

                                                                                                  

Addendum:                                                                                         

2017                                                                                        

     10:39 Narrative: Urine culture results reviewed with Dr. Griffin and Rx written for Keflex    
kcs

           500 mg po QID x 7 days. Message left for patient to call us concerning where she would 

           like Rx called to.                                                                     

     11:33 Narrative: Patient called back and requests [facility] in [place] - Rx called in.      
kcs

                                                                                                  

Signatures:                                                                                       

Dispatcher [employer]                           EDStacy Salguero RN                      RN   Juan C Toth RN RN   cz                                                   

Shahla Qureshi, Babar                  Reg  Alvino Ferrara Aaron, PA-C                 PA-PROMISE ar2                                                  

Mayen,Itzel,RN                      RN   ld5                                                  

Anuradha Whipple,FANTASMA                          RN   cj                                                  

Kavon Scott                            gr2                                                  

Joan Nichols, PCA                   PCA  cln                                                  

Anuradha Whipple RN                               [facility]                                                  

                                                                                                  

**************************************************************************************************

MTDD

## 2017-02-20 NOTE — EDDOCDS
Physician Documentation                                                                           

[facility]                                                                         

Name: Marjorie Graves                                                                             

Age: 25 yrs                                                                                       

Sex: Female                                                                                       

: 1991                                                                                   

MRN: [id number]                                                                                     

Arrival Date: 2017                                                                          

Time: 19:40                                                                                       

Account#: [id number]                                                                              

Bed I5 / M5                                                                                       

Private MD: Libertad                                                                       

Disposition:                                                                                      

17 22:44 Discharged to Home/Self Care. Impression: Hydronephrosis with renal and            

ureteral calculous obstruction - right 4mm UPJ stone with mild hydronephrosis.                  

- Condition is Stable.                                                                            

- Discharge Instructions: Kidney Stones.                                                          

- Prescriptions for Ibuprofen 800 mg Oral Tablet - take 1 tablet by ORAL route every 8            

hours As needed take with food; 30 tablet. Norco 5- 325 mg Oral Tablet - take 1                 

tablet by ORAL route every 6 hours As needed MDD: 4 tabs; 16 tablet. Flomax 0.4 mg              

Oral Capsule, Sust. Release 24 hr - take 1 capsule by ORAL route once daily 1/2 hour            

following the same meal each day; 15 capsule. ZOFRAN ODT 4 mg Oral - dissolve 1                 

tablet by ORAL route every 8 hours As needed do not chew, do not swallow whole; 10              

tablet.                                                                                         

- Medication Reconciliation, Local Pharmacy Hours form.                                           

- Follow up: Jazmin Adkins MD; When: Call to arrange an appointment; Reason: Recheck           

today's complaints, Continuance of care. Follow up: Emergency Department; When: As              

needed; Reason: Worsening of conditions.                                                        

- Problem is new.                                                                                 

- Symptoms have improved.                                                                         

- Notes: call monday for follow up appointment. drink plenty of fluids                            

                                                                                                  

                                                                                                  

Historical:                                                                                       

- Allergies: No known drug Allergies;                                                             

- Home Meds:                                                                                      

1. birth control                                                                                

- PMHx: none;                                                                                     

- PSHx: ;                                                                                

- Social history: Smoking status: Patient states was never smoker of tobacco. No                  

barriers to communication noted, The patient speaks fluent English, Speaks                      

appropriately for age.                                                                          

- Family history: Not pertinent.                                                                  

- : The pt / caregiver states he / she is not on anticoagulants. Home medication list             

is obtained from the patient.                                                                   

- Exposure Risk Screening:: None identified.                                                      

                                                                                                  

                                                                                                  

OB/GYN:                                                                                           

                                                                                             

19:52 no periods due to birth control pill                                                    cz  

                                                                                                  

Vital Signs:                                                                                      

19:42  / 71; Pulse 72; Resp 18 S; Temp 97.9(O); Pulse Ox 99% on R/A; Weight 53.52 kg /  gr2 

      117.99 lbs (R); Height 4 ft. 11 in. (149.86 cm) (R); Pain 9/10;                             

23:05  / 71; Pulse 74; Resp 16; Temp 97.8; Pulse Ox 100% on R/A; Pain 2/10;             ld5 

19:42 Body Mass Index 23.83 (53.52 kg, 149.86 cm)                                             gr2 

                                                                                                  

MDM:                                                                                              

19:57 UCG by Nursing ordered.                                                                 cz  

19:58 Urinalysis Ordered.                                                                     EDMS

19:58 Urine Culture Ordered.                                                                  EDMS

20:03 Undress patient appropriately for examination ordered.                                  ar2 

20:03 IV Saline Lock ordered.                                                                 ar2 

20:03 Ondansetron 4 mg IVP once ordered.                                                      ar2 

20:05 Amylase Ordered.                                                                        EDMS

20:05 Basic Metabolic Profile Ordered.                                                        EDMS

20:05 CBC with Diff Ordered.                                                                  EDMS

20:05 Lipase Ordered.                                                                         EDMS

20:05 Liver Profile Ordered.                                                                  EDMS

20:06 NOTHING BY MOUTH+DIET ordered.                                                          EDMS

20:42 ketorolac 30 mg IVP once ordered.                                                       ar2 

20:52 Urinalysis Reviewed.                                                                    ar2 

20:52 CBC with Diff Reviewed.                                                                 ar2 

20:52 Amylase Reviewed.                                                                       ar2 

20:52 Basic Metabolic Profile Reviewed.                                                       ar2 

20:52 Lipase Reviewed.                                                                        ar2 

20:52 Liver Profile Reviewed.                                                                 ar2 

20:53 NS 0.9% 1000 ml IV at bolus once ordered.                                               ar2 

20:53 CT ABD & PELVIS: No Contrast Ordered.                                                   EDMS

21:48 Financial registration complete.                                                        zo  

22:25 [facility] Payment Agreement was scanned into [employer] and attached to record.               zo  

22:42 Dispense Urine Strainer ordered.                                                        ar2 

22:47 HYDROcodone-acetaminophen 4 pack- 5 mg-325 mg 1 packets PO Per package directions;      ar2 

      Dispense with patient. 1 po q4h prn for pain ordered.                                       

22:48 CT ABD & PELVIS: No Contrast Reviewed.                                                  ar2

                                                                                             

11:30 T-Sheet-- Draft Copy was scanned into [employer] and attached to record.                   gb  

11:30 Radiology Report was scanned into [employer] and attached to record.                       gb  

                                                                                                  

Point of Care Testing:                                                                            

      Urine Pregnancy:                                                                            

                                                                                             

20:16 hCG Reading: Negative; Control Reading: Positive;                                       cln 

      Ranges:                                                                                     

                                                                                                  

Administered Medications:                                                                         

20:24 Drug: Ondansetron 4 mg [ondansetron HCl 2 mg/mL intravenous solution (2 mL)] Route:     ld5 

      IVP; Site: left antecubital;                                                                

23:05 Follow up: Response: Nausea is decreased                                                ld5 

20:48 Drug: ketorolac 30 mg [ketorolac 30 mg/mL (1 mL) injection solution (1 mL)] Route: IVP; ld5 

      Site: left antecubital;                                                                     

23:00 Follow up: Response: Med's dispensed home                                               ld5 

21:11 Drug: NS 0.9% 1000 ml [sodium chloride 0.9 % intravenous solution] Route: IV; Rate:     ld5 

      bolus; Site: left antecubital;                                                              

22:59 Follow up: IV Status: Completed infusion; IV Intake: 1000ml                             ld5 

22:59 Drug: HYDROcodone-acetaminophen 4 pack- 1 packets [hydrocodone 5 mg-acetaminophen 325   ld5 

      mg tablet (1 tabs)] {Co-Signature: [facility] (Anuradha Whipple RN).} Route: PO;                        

22:59 Follow up: Response: Med's dispensed home                                               ld5 

                                                                                                  

                                                                                                  

Signatures:                                                                                       

Dispatcher [employer]                           EDMS                                                 

Juan C Carr RN RN   cz                                                   

Shahla Qureshi, Reg                  Reg  gb                                                   

Alvino Machuca Aaron, PA-C PA-C ar2                                                  

Itzel Mayen RN RN   [facility]                                                  

Anuradha Whipple RN RN   [facility]                                                  

Anuradha Whipple RN                               [facility]                                                  

                                                                                                  

The chart was reviewed and I authenticate all verbal orders and agree with the evaluation and 
treatment provided.Attachments:

22:25 [facility] Payment Agreement                                                                zo  

                                                                                             

11:30 T-Sheet-- Draft Copy                                                                    gb  

                                                                                                  

**************************************************************************************************



*** Chart Complete ***
MTDD

## 2017-02-20 NOTE — EDDOCDS
Physician Documentation                                                                           

[facility]                                                                         

Name: Marjorie Gravse                                                                             

Age: 25 yrs                                                                                       

Sex: Female                                                                                       

: 1991                                                                                   

MRN: [id number]                                                                                     

Arrival Date: 2017                                                                          

Time: 19:40                                                                                       

Account#: [id number]                                                                              

Bed I5 / M5                                                                                       

Private MD: [place]                                                                       

Disposition:                                                                                      

17 22:44 Discharged to Home/Self Care. Impression: Hydronephrosis with renal and            

ureteral calculous obstruction - right 4mm UPJ stone with mild hydronephrosis.                  

- Condition is Stable.                                                                            

- Discharge Instructions: Kidney Stones.                                                          

- Prescriptions for Ibuprofen 800 mg Oral Tablet - take 1 tablet by ORAL route every 8            

hours As needed take with food; 30 tablet. Norco 5- 325 mg Oral Tablet - take 1                 

tablet by ORAL route every 6 hours As needed MDD: 4 tabs; 16 tablet. Flomax 0.4 mg              

Oral Capsule, Sust. Release 24 hr - take 1 capsule by ORAL route once daily 1/2 hour            

following the same meal each day; 15 capsule. ZOFRAN ODT 4 mg Oral - dissolve 1                 

tablet by ORAL route every 8 hours As needed do not chew, do not swallow whole; 10              

tablet.                                                                                         

- Medication Reconciliation, Local Pharmacy Hours form.                                           

- Follow up: Jazmin Adkins MD; When: Call to arrange an appointment; Reason: Recheck           

today's complaints, Continuance of care. Follow up: Emergency Department; When: As              

needed; Reason: Worsening of conditions.                                                        

- Problem is new.                                                                                 

- Symptoms have improved.                                                                         

- Notes: call monday for follow up appointment. drink plenty of fluids                            

                                                                                                  

                                                                                                  

Historical:                                                                                       

- Allergies: No known drug Allergies;                                                             

- Home Meds:                                                                                      

1. birth control                                                                                

- PMHx: none;                                                                                     

- PSHx: ;                                                                                

- Social history: Smoking status: Patient states was never smoker of tobacco. No                  

barriers to communication noted, The patient speaks fluent English, Speaks                      

appropriately for age.                                                                          

- Family history: Not pertinent.                                                                  

- : The pt / caregiver states he / she is not on anticoagulants. Home medication list             

is obtained from the patient.                                                                   

- Exposure Risk Screening:: None identified.                                                      

                                                                                                  

                                                                                                  

OB/GYN:                                                                                           

                                                                                             

19:52 no periods due to birth control pill                                                    cz  

                                                                                                  

Vital Signs:                                                                                      

19:42  / 71; Pulse 72; Resp 18 S; Temp 97.9(O); Pulse Ox 99% on R/A; Weight 53.52 kg /  gr2 

      117.99 lbs (R); Height 4 ft. 11 in. (149.86 cm) (R); Pain 9/10;                             

23:05  / 71; Pulse 74; Resp 16; Temp 97.8; Pulse Ox 100% on R/A; Pain 2/10;             ld5 

19:42 Body Mass Index 23.83 (53.52 kg, 149.86 cm)                                             gr2 

                                                                                                  

MDM:                                                                                              

19:57 UCG by Nursing ordered.                                                                 cz  

19:58 Urinalysis Ordered.                                                                     EDMS

19:58 Urine Culture Ordered.                                                                  EDMS

20:03 Undress patient appropriately for examination ordered.                                  ar2 

20:03 IV Saline Lock ordered.                                                                 ar2 

20:03 Ondansetron 4 mg IVP once ordered.                                                      ar2 

20:05 Amylase Ordered.                                                                        EDMS

20:05 Basic Metabolic Profile Ordered.                                                        EDMS

20:05 CBC with Diff Ordered.                                                                  EDMS

20:05 Lipase Ordered.                                                                         EDMS

20:05 Liver Profile Ordered.                                                                  EDMS

20:06 NOTHING BY MOUTH+DIET ordered.                                                          EDMS

20:42 ketorolac 30 mg IVP once ordered.                                                       ar2 

20:52 Urinalysis Reviewed.                                                                    ar2 

20:52 CBC with Diff Reviewed.                                                                 ar2 

20:52 Amylase Reviewed.                                                                       ar2 

20:52 Basic Metabolic Profile Reviewed.                                                       ar2 

20:52 Lipase Reviewed.                                                                        ar2 

20:52 Liver Profile Reviewed.                                                                 ar2 

20:53 NS 0.9% 1000 ml IV at bolus once ordered.                                               ar2 

20:53 CT ABD & PELVIS: No Contrast Ordered.                                                   EDMS

21:48 Financial registration complete.                                                        zo  

22:25 [facility] Payment Agreement was scanned into [employer] and attached to record.               zo  

22:42 Dispense Urine Strainer ordered.                                                        ar2 

22:47 HYDROcodone-acetaminophen 4 pack- 5 mg-325 mg 1 packets PO Per package directions;      ar2 

      Dispense with patient. 1 po q4h prn for pain ordered.                                       

22:48 CT ABD & PELVIS: No Contrast Reviewed.                                                  ar2

                                                                                             

11:30 T-Sheet-- Draft Copy was scanned into [employer] and attached to record.                   gb  

11:30 Radiology Report was scanned into [employer] and attached to record.                       gb  

                                                                                                  

Point of Care Testing:                                                                            

      Urine Pregnancy:                                                                            

                                                                                             

20:16 hCG Reading: Negative; Control Reading: Positive;                                       cln 

      Ranges:                                                                                     

                                                                                                  

Administered Medications:                                                                         

20:24 Drug: Ondansetron 4 mg [ondansetron HCl 2 mg/mL intravenous solution (2 mL)] Route:     ld5 

      IVP; Site: left antecubital;                                                                

23:05 Follow up: Response: Nausea is decreased                                                ld5 

20:48 Drug: ketorolac 30 mg [ketorolac 30 mg/mL (1 mL) injection solution (1 mL)] Route: IVP; ld5 

      Site: left antecubital;                                                                     

23:00 Follow up: Response: Med's dispensed home                                               ld5 

21:11 Drug: NS 0.9% 1000 ml [sodium chloride 0.9 % intravenous solution] Route: IV; Rate:     ld5 

      bolus; Site: left antecubital;                                                              

22:59 Follow up: IV Status: Completed infusion; IV Intake: 1000ml                             ld5 

22:59 Drug: HYDROcodone-acetaminophen 4 pack- 1 packets [hydrocodone 5 mg-acetaminophen 325   ld5 

      mg tablet (1 tabs)] {Co-Signature: [facility] (Anuradha Whipple RN).} Route: PO;                        

22:59 Follow up: Response: Med's dispensed home                                               ld5 

                                                                                                  

                                                                                                  

Signatures:                                                                                       

Dispatcher [employer]                           EDMS                                                 

Juan C Carr RN RN   cz                                                   

Shahla Qureshi, Reg                  Reg  gb                                                   

Alvino Machuca Aaron, PA-C PA-C ar2                                                  

Itzel Mayen RN RN   [facility]                                                  

Anuradha Whipple RN RN   [facility]                                                  

Anuradha Whipple RN                               [facility]                                                  

                                                                                                  

The chart was reviewed and I authenticate all verbal orders and agree with the evaluation and 
treatment provided.Attachments:

22:25 [facility] Payment Agreement                                                                zo  

                                                                                             

11:30 T-Sheet-- Draft Copy                                                                    gb  

                                                                                                  

**************************************************************************************************

MTDD

## 2017-02-20 NOTE — EDDOCDS
Physician Documentation                                                                           

[facility]                                                                         

Name: Marjorie Graves                                                                             

Age: 25 yrs                                                                                       

Sex: Female                                                                                       

: 1991                                                                                   

MRN: [id number]                                                                                     

Arrival Date: 2017                                                                          

Time: 19:40                                                                                       

Account#: [id number]                                                                              

Bed I5 / M5                                                                                       

Private MD: [place]                                                                       

Disposition:                                                                                      

17 22:44 Discharged to Home/Self Care. Impression: Hydronephrosis with renal and            

ureteral calculous obstruction - right 4mm UPJ stone with mild hydronephrosis.                  

- Condition is Stable.                                                                            

- Discharge Instructions: Kidney Stones.                                                          

- Prescriptions for Ibuprofen 800 mg Oral Tablet - take 1 tablet by ORAL route every 8            

hours As needed take with food; 30 tablet. Norco 5- 325 mg Oral Tablet - take 1                 

tablet by ORAL route every 6 hours As needed MDD: 4 tabs; 16 tablet. Flomax 0.4 mg              

Oral Capsule, Sust. Release 24 hr - take 1 capsule by ORAL route once daily 1/2 hour            

following the same meal each day; 15 capsule. ZOFRAN ODT 4 mg Oral - dissolve 1                 

tablet by ORAL route every 8 hours As needed do not chew, do not swallow whole; 10              

tablet.                                                                                         

- Medication Reconciliation, Local Pharmacy Hours form.                                           

- Follow up: Jazmin Adkins MD; When: Call to arrange an appointment; Reason: Recheck           

today's complaints, Continuance of care. Follow up: Emergency Department; When: As              

needed; Reason: Worsening of conditions.                                                        

- Problem is new.                                                                                 

- Symptoms have improved.                                                                         

- Notes: call monday for follow up appointment. drink plenty of fluids                            

                                                                                                  

                                                                                                  

Historical:                                                                                       

- Allergies: No known drug Allergies;                                                             

- Home Meds:                                                                                      

1. birth control                                                                                

- PMHx: none;                                                                                     

- PSHx: ;                                                                                

- Social history: Smoking status: Patient states was never smoker of tobacco. No                  

barriers to communication noted, The patient speaks fluent English, Speaks                      

appropriately for age.                                                                          

- Family history: Not pertinent.                                                                  

- : The pt / caregiver states he / she is not on anticoagulants. Home medication list             

is obtained from the patient.                                                                   

- Exposure Risk Screening:: None identified.                                                      

                                                                                                  

                                                                                                  

OB/GYN:                                                                                           

                                                                                             

19:52 no periods due to birth control pill                                                    cz  

                                                                                                  

Vital Signs:                                                                                      

19:42  / 71; Pulse 72; Resp 18 S; Temp 97.9(O); Pulse Ox 99% on R/A; Weight 53.52 kg /  gr2 

      117.99 lbs (R); Height 4 ft. 11 in. (149.86 cm) (R); Pain 9/10;                             

23:05  / 71; Pulse 74; Resp 16; Temp 97.8; Pulse Ox 100% on R/A; Pain 2/10;             ld5 

19:42 Body Mass Index 23.83 (53.52 kg, 149.86 cm)                                             gr2 

                                                                                                  

MDM:                                                                                              

19:57 UCG by Nursing ordered.                                                                 cz  

19:58 Urinalysis Ordered.                                                                     EDMS

19:58 Urine Culture Ordered.                                                                  EDMS

20:03 Undress patient appropriately for examination ordered.                                  ar2 

20:03 IV Saline Lock ordered.                                                                 ar2 

20:03 Ondansetron 4 mg IVP once ordered.                                                      ar2 

20:05 Amylase Ordered.                                                                        EDMS

20:05 Basic Metabolic Profile Ordered.                                                        EDMS

20:05 CBC with Diff Ordered.                                                                  EDMS

20:05 Lipase Ordered.                                                                         EDMS

20:05 Liver Profile Ordered.                                                                  EDMS

20:06 NOTHING BY MOUTH+DIET ordered.                                                          EDMS

20:42 ketorolac 30 mg IVP once ordered.                                                       ar2 

20:52 Urinalysis Reviewed.                                                                    ar2 

20:52 CBC with Diff Reviewed.                                                                 ar2 

20:52 Amylase Reviewed.                                                                       ar2 

20:52 Basic Metabolic Profile Reviewed.                                                       ar2 

20:52 Lipase Reviewed.                                                                        ar2 

20:52 Liver Profile Reviewed.                                                                 ar2 

20:53 NS 0.9% 1000 ml IV at bolus once ordered.                                               ar2 

20:53 CT ABD & PELVIS: No Contrast Ordered.                                                   EDMS

21:48 Financial registration complete.                                                        zo  

22:25 [facility] Payment Agreement was scanned into [employer] and attached to record.               zo  

22:42 Dispense Urine Strainer ordered.                                                        ar2 

22:47 HYDROcodone-acetaminophen 4 pack- 5 mg-325 mg 1 packets PO Per package directions;      ar2 

      Dispense with patient. 1 po q4h prn for pain ordered.                                       

22:48 CT ABD & PELVIS: No Contrast Reviewed.                                                  ar2

                                                                                             

11:30 T-Sheet-- Draft Copy was scanned into [employer] and attached to record.                   gb  

11:30 Radiology Report was scanned into [employer] and attached to record.                       gb  

                                                                                                  

Point of Care Testing:                                                                            

      Urine Pregnancy:                                                                            

                                                                                             

20:16 hCG Reading: Negative; Control Reading: Positive;                                       cln 

      Ranges:                                                                                     

                                                                                                  

Administered Medications:                                                                         

20:24 Drug: Ondansetron 4 mg [ondansetron HCl 2 mg/mL intravenous solution (2 mL)] Route:     ld5 

      IVP; Site: left antecubital;                                                                

23:05 Follow up: Response: Nausea is decreased                                                ld5 

20:48 Drug: ketorolac 30 mg [ketorolac 30 mg/mL (1 mL) injection solution (1 mL)] Route: IVP; ld5 

      Site: left antecubital;                                                                     

23:00 Follow up: Response: Med's dispensed home                                               ld5 

21:11 Drug: NS 0.9% 1000 ml [sodium chloride 0.9 % intravenous solution] Route: IV; Rate:     ld5 

      bolus; Site: left antecubital;                                                              

22:59 Follow up: IV Status: Completed infusion; IV Intake: 1000ml                             ld5 

22:59 Drug: HYDROcodone-acetaminophen 4 pack- 1 packets [hydrocodone 5 mg-acetaminophen 325   ld5 

      mg tablet (1 tabs)] {Co-Signature: [facility] (Anuradha Whipple RN).} Route: PO;                        

22:59 Follow up: Response: Med's dispensed home                                               ld5 

                                                                                                  

                                                                                                  

Signatures:                                                                                       

Dispatcher [employer]                           EDMS                                                 

Juan C Carr RN RN   cz                                                   

Shahla Qureshi, Reg                  Reg  gb                                                   

Alvino Machuca Aaron, PA-C PA-C ar2                                                  

Itzel Mayen RN RN   [facility]                                                  

Anuradha Whipple RN RN   [facility]                                                  

Anuradha Whipple RN                               [facility]                                                  

                                                                                                  

The chart was reviewed and I authenticate all verbal orders and agree with the evaluation and 
treatment provided.Attachments:

22:25 [facility] Payment Agreement                                                                zo  

                                                                                             

11:30 T-Sheet-- Draft Copy                                                                    gb  

                                                                                                  

**************************************************************************************************



*** Chart Complete ***
MTDD

## 2017-02-20 NOTE — EDDOCDS
Physician Documentation                                                                           

[facility]                                                                         

Name: Marjorie Graves                                                                             

Age: 25 yrs                                                                                       

Sex: Female                                                                                       

: 1991                                                                                   

MRN: [id number]                                                                                     

Arrival Date: 2017                                                                          

Time: 19:40                                                                                       

Account#: [id number]                                                                              

Bed I5 / M5                                                                                       

Private MD: [facility]                                                                       

Disposition:                                                                                      

17 22:44 Discharged to Home/Self Care. Impression: Hydronephrosis with renal and            

ureteral calculous obstruction - right 4mm UPJ stone with mild hydronephrosis.                  

- Condition is Stable.                                                                            

- Discharge Instructions: Kidney Stones.                                                          

- Prescriptions for Ibuprofen 800 mg Oral Tablet - take 1 tablet by ORAL route every 8            

hours As needed take with food; 30 tablet. Norco 5- 325 mg Oral Tablet - take 1                 

tablet by ORAL route every 6 hours As needed MDD: 4 tabs; 16 tablet. Flomax 0.4 mg              

Oral Capsule, Sust. Release 24 hr - take 1 capsule by ORAL route once daily 1/2 hour            

following the same meal each day; 15 capsule. ZOFRAN ODT 4 mg Oral - dissolve 1                 

tablet by ORAL route every 8 hours As needed do not chew, do not swallow whole; 10              

tablet.                                                                                         

- Medication Reconciliation, Local Pharmacy Hours form.                                           

- Follow up: Jazmin Adkins MD; When: Call to arrange an appointment; Reason: Recheck           

today's complaints, Continuance of care. Follow up: Emergency Department; When: As              

needed; Reason: Worsening of conditions.                                                        

- Problem is new.                                                                                 

- Symptoms have improved.                                                                         

- Notes: call monday for follow up appointment. drink plenty of fluids                            

                                                                                                  

                                                                                                  

Historical:                                                                                       

- Allergies: No known drug Allergies;                                                             

- Home Meds:                                                                                      

1. birth control                                                                                

- PMHx: none;                                                                                     

- PSHx: ;                                                                                

- Social history: Smoking status: Patient states was never smoker of tobacco. No                  

barriers to communication noted, The patient speaks fluent English, Speaks                      

appropriately for age.                                                                          

- Family history: Not pertinent.                                                                  

- : The pt / caregiver states he / she is not on anticoagulants. Home medication list             

is obtained from the patient.                                                                   

- Exposure Risk Screening:: None identified.                                                      

                                                                                                  

                                                                                                  

OB/GYN:                                                                                           

                                                                                             

19:52 no periods due to birth control pill                                                    cz  

                                                                                                  

Vital Signs:                                                                                      

19:42  / 71; Pulse 72; Resp 18 S; Temp 97.9(O); Pulse Ox 99% on R/A; Weight 53.52 kg /  gr2 

      117.99 lbs (R); Height 4 ft. 11 in. (149.86 cm) (R); Pain 9/10;                             

23:05  / 71; Pulse 74; Resp 16; Temp 97.8; Pulse Ox 100% on R/A; Pain 2/10;             ld5 

19:42 Body Mass Index 23.83 (53.52 kg, 149.86 cm)                                             gr2 

                                                                                                  

MDM:                                                                                              

19:57 UCG by Nursing ordered.                                                                 cz  

19:58 Urinalysis Ordered.                                                                     EDMS

19:58 Urine Culture Ordered.                                                                  EDMS

20:03 Undress patient appropriately for examination ordered.                                  ar2 

20:03 IV Saline Lock ordered.                                                                 ar2 

20:03 Ondansetron 4 mg IVP once ordered.                                                      ar2 

20:05 Amylase Ordered.                                                                        EDMS

20:05 Basic Metabolic Profile Ordered.                                                        EDMS

20:05 CBC with Diff Ordered.                                                                  EDMS

20:05 Lipase Ordered.                                                                         EDMS

20:05 Liver Profile Ordered.                                                                  EDMS

20:06 NOTHING BY MOUTH+DIET ordered.                                                          EDMS

20:42 ketorolac 30 mg IVP once ordered.                                                       ar2 

20:52 Urinalysis Reviewed.                                                                    ar2 

20:52 CBC with Diff Reviewed.                                                                 ar2 

20:52 Amylase Reviewed.                                                                       ar2 

20:52 Basic Metabolic Profile Reviewed.                                                       ar2 

20:52 Lipase Reviewed.                                                                        ar2 

20:52 Liver Profile Reviewed.                                                                 ar2 

20:53 NS 0.9% 1000 ml IV at bolus once ordered.                                               ar2 

20:53 CT ABD & PELVIS: No Contrast Ordered.                                                   EDMS

21:48 Financial registration complete.                                                        zo  

22:25 [facility] Payment Agreement was scanned into [employer] and attached to record.               zo  

22:42 Dispense Urine Strainer ordered.                                                        ar2 

22:47 HYDROcodone-acetaminophen 4 pack- 5 mg-325 mg 1 packets PO Per package directions;      ar2 

      Dispense with patient. 1 po q4h prn for pain ordered.                                       

22:48 CT ABD & PELVIS: No Contrast Reviewed.                                                  ar2

                                                                                             

11:30 T-Sheet-- Draft Copy was scanned into [employer] and attached to record.                   gb  

11:30 Radiology Report was scanned into [employer] and attached to record.                       gb  

                                                                                                  

Point of Care Testing:                                                                            

      Urine Pregnancy:                                                                            

                                                                                             

20:16 hCG Reading: Negative; Control Reading: Positive;                                       cln 

      Ranges:                                                                                     

                                                                                                  

Administered Medications:                                                                         

20:24 Drug: Ondansetron 4 mg [ondansetron HCl 2 mg/mL intravenous solution (2 mL)] Route:     ld5 

      IVP; Site: left antecubital;                                                                

23:05 Follow up: Response: Nausea is decreased                                                ld5 

20:48 Drug: ketorolac 30 mg [ketorolac 30 mg/mL (1 mL) injection solution (1 mL)] Route: IVP; ld5 

      Site: left antecubital;                                                                     

23:00 Follow up: Response: Med's dispensed home                                               ld5 

21:11 Drug: NS 0.9% 1000 ml [sodium chloride 0.9 % intravenous solution] Route: IV; Rate:     ld5 

      bolus; Site: left antecubital;                                                              

22:59 Follow up: IV Status: Completed infusion; IV Intake: 1000ml                             ld5 

22:59 Drug: HYDROcodone-acetaminophen 4 pack- 1 packets [hydrocodone 5 mg-acetaminophen 325   ld5 

      mg tablet (1 tabs)] {Co-Signature: [facility] (Anuradha Whipple RN).} Route: PO;                        

22:59 Follow up: Response: Med's dispensed home                                               ld5 

                                                                                                  

                                                                                                  

Signatures:                                                                                       

Dispatcher [employer]                           EDMS                                                 

Juan C Carr RN RN   cz                                                   

Shahla Qureshi, Reg                  Reg  gb                                                   

Alvino Machuca Aaron, PA-C PA-C ar2                                                  

Itzel Mayen RN RN   [facility]                                                  

Anuradha Whipple RN RN   [facility]                                                  

Anuradha Whipple RN                               [facility]                                                  

                                                                                                  

The chart was reviewed and I authenticate all verbal orders and agree with the evaluation and 
treatment provided.Attachments:

22:25 [facility] Payment Agreement                                                                zo  

                                                                                             

11:30 T-Sheet-- Draft Copy                                                                    gb  

                                                                                                  

**************************************************************************************************

MTDD

## 2017-02-20 NOTE — EDDOCDS
Physician Documentation                                                                           

[facility]                                                                         

Name: Marjorie Graves                                                                             

Age: 25 yrs                                                                                       

Sex: Female                                                                                       

: 1991                                                                                   

MRN: [id number]                                                                                     

Arrival Date: 2017                                                                          

Time: 19:40                                                                                       

Account#: [id number]                                                                              

Bed I5 / M5                                                                                       

Private MD: [place]                                                                       

Disposition:                                                                                      

17 22:44 Discharged to Home/Self Care. Impression: Hydronephrosis with renal and            

ureteral calculous obstruction - right 4mm UPJ stone with mild hydronephrosis.                  

- Condition is Stable.                                                                            

- Discharge Instructions: Kidney Stones.                                                          

- Prescriptions for Ibuprofen 800 mg Oral Tablet - take 1 tablet by ORAL route every 8            

hours As needed take with food; 30 tablet. Norco 5- 325 mg Oral Tablet - take 1                 

tablet by ORAL route every 6 hours As needed MDD: 4 tabs; 16 tablet. Flomax 0.4 mg              

Oral Capsule, Sust. Release 24 hr - take 1 capsule by ORAL route once daily 1/2 hour            

following the same meal each day; 15 capsule. ZOFRAN ODT 4 mg Oral - dissolve 1                 

tablet by ORAL route every 8 hours As needed do not chew, do not swallow whole; 10              

tablet.                                                                                         

- Medication Reconciliation, Local Pharmacy Hours form.                                           

- Follow up: Jazmin Adkins MD; When: Call to arrange an appointment; Reason: Recheck           

today's complaints, Continuance of care. Follow up: Emergency Department; When: As              

needed; Reason: Worsening of conditions.                                                        

- Problem is new.                                                                                 

- Symptoms have improved.                                                                         

- Notes: call monday for follow up appointment. drink plenty of fluids                            

                                                                                                  

                                                                                                  

Historical:                                                                                       

- Allergies: No known drug Allergies;                                                             

- Home Meds:                                                                                      

1. birth control                                                                                

- PMHx: none;                                                                                     

- PSHx: ;                                                                                

- Social history: Smoking status: Patient states was never smoker of tobacco. No                  

barriers to communication noted, The patient speaks fluent English, Speaks                      

appropriately for age.                                                                          

- Family history: Not pertinent.                                                                  

- : The pt / caregiver states he / she is not on anticoagulants. Home medication list             

is obtained from the patient.                                                                   

- Exposure Risk Screening:: None identified.                                                      

                                                                                                  

                                                                                                  

OB/GYN:                                                                                           

                                                                                             

19:52 no periods due to birth control pill                                                    cz  

                                                                                                  

Vital Signs:                                                                                      

19:42  / 71; Pulse 72; Resp 18 S; Temp 97.9(O); Pulse Ox 99% on R/A; Weight 53.52 kg /  gr2 

      117.99 lbs (R); Height 4 ft. 11 in. (149.86 cm) (R); Pain 9/10;                             

23:05  / 71; Pulse 74; Resp 16; Temp 97.8; Pulse Ox 100% on R/A; Pain 2/10;             ld5 

19:42 Body Mass Index 23.83 (53.52 kg, 149.86 cm)                                             gr2 

                                                                                                  

MDM:                                                                                              

19:57 UCG by Nursing ordered.                                                                 cz  

19:58 Urinalysis Ordered.                                                                     EDMS

19:58 Urine Culture Ordered.                                                                  EDMS

20:03 Undress patient appropriately for examination ordered.                                  ar2 

20:03 IV Saline Lock ordered.                                                                 ar2 

20:03 Ondansetron 4 mg IVP once ordered.                                                      ar2 

20:05 Amylase Ordered.                                                                        EDMS

20:05 Basic Metabolic Profile Ordered.                                                        EDMS

20:05 CBC with Diff Ordered.                                                                  EDMS

20:05 Lipase Ordered.                                                                         EDMS

20:05 Liver Profile Ordered.                                                                  EDMS

20:06 NOTHING BY MOUTH+DIET ordered.                                                          EDMS

20:42 ketorolac 30 mg IVP once ordered.                                                       ar2 

20:52 Urinalysis Reviewed.                                                                    ar2 

20:52 CBC with Diff Reviewed.                                                                 ar2 

20:52 Amylase Reviewed.                                                                       ar2 

20:52 Basic Metabolic Profile Reviewed.                                                       ar2 

20:52 Lipase Reviewed.                                                                        ar2 

20:52 Liver Profile Reviewed.                                                                 ar2 

20:53 NS 0.9% 1000 ml IV at bolus once ordered.                                               ar2 

20:53 CT ABD & PELVIS: No Contrast Ordered.                                                   EDMS

21:48 Financial registration complete.                                                        zo  

22:25 [facility] Payment Agreement was scanned into [employer] and attached to record.               zo  

22:42 Dispense Urine Strainer ordered.                                                        ar2 

22:47 HYDROcodone-acetaminophen 4 pack- 5 mg-325 mg 1 packets PO Per package directions;      ar2 

      Dispense with patient. 1 po q4h prn for pain ordered.                                       

22:48 CT ABD & PELVIS: No Contrast Reviewed.                                                  ar2

                                                                                             

11:30 T-Sheet-- Draft Copy was scanned into [employer] and attached to record.                   gb  

11:30 Radiology Report was scanned into [employer] and attached to record.                       gb  

                                                                                                  

Point of Care Testing:                                                                            

      Urine Pregnancy:                                                                            

                                                                                             

20:16 hCG Reading: Negative; Control Reading: Positive;                                       cln 

      Ranges:                                                                                     

                                                                                                  

Administered Medications:                                                                         

20:24 Drug: Ondansetron 4 mg [ondansetron HCl 2 mg/mL intravenous solution (2 mL)] Route:     ld5 

      IVP; Site: left antecubital;                                                                

23:05 Follow up: Response: Nausea is decreased                                                ld5 

20:48 Drug: ketorolac 30 mg [ketorolac 30 mg/mL (1 mL) injection solution (1 mL)] Route: IVP; ld5 

      Site: left antecubital;                                                                     

23:00 Follow up: Response: Med's dispensed home                                               ld5 

21:11 Drug: NS 0.9% 1000 ml [sodium chloride 0.9 % intravenous solution] Route: IV; Rate:     ld5 

      bolus; Site: left antecubital;                                                              

22:59 Follow up: IV Status: Completed infusion; IV Intake: 1000ml                             ld5 

22:59 Drug: HYDROcodone-acetaminophen 4 pack- 1 packets [hydrocodone 5 mg-acetaminophen 325   ld5 

      mg tablet (1 tabs)] {Co-Signature: [facility] (Anuradha Whipple RN).} Route: PO;                        

22:59 Follow up: Response: Med's dispensed home                                               ld5 

                                                                                                  

                                                                                                  

Signatures:                                                                                       

Dispatcher [employer]                           EDMS                                                 

Juan C Carr RN RN   cz                                                   

Shahla Qureshi, Reg                  Reg  gb                                                   

Alvino Machuca Aaron, PA-C PA-C ar2                                                  

Itzel Mayen RN RN   [facility]                                                  

Anuradha Whipple RN RN   [facility]                                                  

Anuradha Whipple RN                               [facility]                                                  

                                                                                                  

The chart was reviewed and I authenticate all verbal orders and agree with the evaluation and 
treatment provided.Attachments:

22:25 [facility] Payment Agreement                                                                zo  

                                                                                             

11:30 T-Sheet-- Draft Copy                                                                    gb  

                                                                                                  

**************************************************************************************************



*** Chart Complete ***
MTDD

## 2017-02-20 NOTE — EDDOCDS
Physician Documentation                                                                           

[facility]                                                                         

Name: Marjorie Graves                                                                             

Age: 25 yrs                                                                                       

Sex: Female                                                                                       

: 1991                                                                                   

MRN: [id number]                                                                                     

Arrival Date: 2017                                                                          

Time: 19:40                                                                                       

Account#: [id number]                                                                              

Bed I5 / M5                                                                                       

Private MD: [facility]                                                                       

Disposition:                                                                                      

17 22:44 Discharged to Home/Self Care. Impression: Hydronephrosis with renal and            

ureteral calculous obstruction - right 4mm UPJ stone with mild hydronephrosis.                  

- Condition is Stable.                                                                            

- Discharge Instructions: Kidney Stones.                                                          

- Prescriptions for Ibuprofen 800 mg Oral Tablet - take 1 tablet by ORAL route every 8            

hours As needed take with food; 30 tablet. Norco 5- 325 mg Oral Tablet - take 1                 

tablet by ORAL route every 6 hours As needed MDD: 4 tabs; 16 tablet. Flomax 0.4 mg              

Oral Capsule, Sust. Release 24 hr - take 1 capsule by ORAL route once daily 1/2 hour            

following the same meal each day; 15 capsule. ZOFRAN ODT 4 mg Oral - dissolve 1                 

tablet by ORAL route every 8 hours As needed do not chew, do not swallow whole; 10              

tablet.                                                                                         

- Medication Reconciliation, Local Pharmacy Hours form.                                           

- Follow up: Jazmin Adkins MD; When: Call to arrange an appointment; Reason: Recheck           

today's complaints, Continuance of care. Follow up: Emergency Department; When: As              

needed; Reason: Worsening of conditions.                                                        

- Problem is new.                                                                                 

- Symptoms have improved.                                                                         

- Notes: call monday for follow up appointment. drink plenty of fluids                            

                                                                                                  

                                                                                                  

Historical:                                                                                       

- Allergies: No known drug Allergies;                                                             

- Home Meds:                                                                                      

1. birth control                                                                                

- PMHx: none;                                                                                     

- PSHx: ;                                                                                

- Social history: Smoking status: Patient states was never smoker of tobacco. No                  

barriers to communication noted, The patient speaks fluent English, Speaks                      

appropriately for age.                                                                          

- Family history: Not pertinent.                                                                  

- : The pt / caregiver states he / she is not on anticoagulants. Home medication list             

is obtained from the patient.                                                                   

- Exposure Risk Screening:: None identified.                                                      

                                                                                                  

                                                                                                  

OB/GYN:                                                                                           

                                                                                             

19:52 no periods due to birth control pill                                                    cz  

                                                                                                  

Vital Signs:                                                                                      

19:42  / 71; Pulse 72; Resp 18 S; Temp 97.9(O); Pulse Ox 99% on R/A; Weight 53.52 kg /  gr2 

      117.99 lbs (R); Height 4 ft. 11 in. (149.86 cm) (R); Pain 9/10;                             

23:05  / 71; Pulse 74; Resp 16; Temp 97.8; Pulse Ox 100% on R/A; Pain 2/10;             ld5 

19:42 Body Mass Index 23.83 (53.52 kg, 149.86 cm)                                             gr2 

                                                                                                  

MDM:                                                                                              

19:57 UCG by Nursing ordered.                                                                 cz  

19:58 Urinalysis Ordered.                                                                     EDMS

19:58 Urine Culture Ordered.                                                                  EDMS

20:03 Undress patient appropriately for examination ordered.                                  ar2 

20:03 IV Saline Lock ordered.                                                                 ar2 

20:03 Ondansetron 4 mg IVP once ordered.                                                      ar2 

20:05 Amylase Ordered.                                                                        EDMS

20:05 Basic Metabolic Profile Ordered.                                                        EDMS

20:05 CBC with Diff Ordered.                                                                  EDMS

20:05 Lipase Ordered.                                                                         EDMS

20:05 Liver Profile Ordered.                                                                  EDMS

20:06 NOTHING BY MOUTH+DIET ordered.                                                          EDMS

20:42 ketorolac 30 mg IVP once ordered.                                                       ar2 

20:52 Urinalysis Reviewed.                                                                    ar2 

20:52 CBC with Diff Reviewed.                                                                 ar2 

20:52 Amylase Reviewed.                                                                       ar2 

20:52 Basic Metabolic Profile Reviewed.                                                       ar2 

20:52 Lipase Reviewed.                                                                        ar2 

20:52 Liver Profile Reviewed.                                                                 ar2 

20:53 NS 0.9% 1000 ml IV at bolus once ordered.                                               ar2 

20:53 CT ABD & PELVIS: No Contrast Ordered.                                                   EDMS

21:48 Financial registration complete.                                                        zo  

22:25 [facility] Payment Agreement was scanned into [employer] and attached to record.               zo  

22:42 Dispense Urine Strainer ordered.                                                        ar2 

22:47 HYDROcodone-acetaminophen 4 pack- 5 mg-325 mg 1 packets PO Per package directions;      ar2 

      Dispense with patient. 1 po q4h prn for pain ordered.                                       

22:48 CT ABD & PELVIS: No Contrast Reviewed.                                                  ar2

                                                                                             

11:30 T-Sheet-- Draft Copy was scanned into [employer] and attached to record.                   gb  

11:30 Radiology Report was scanned into [employer] and attached to record.                       gb  

                                                                                                  

Point of Care Testing:                                                                            

      Urine Pregnancy:                                                                            

                                                                                             

20:16 hCG Reading: Negative; Control Reading: Positive;                                       cln 

      Ranges:                                                                                     

                                                                                                  

Administered Medications:                                                                         

20:24 Drug: Ondansetron 4 mg [ondansetron HCl 2 mg/mL intravenous solution (2 mL)] Route:     ld5 

      IVP; Site: left antecubital;                                                                

23:05 Follow up: Response: Nausea is decreased                                                ld5 

20:48 Drug: ketorolac 30 mg [ketorolac 30 mg/mL (1 mL) injection solution (1 mL)] Route: IVP; ld5 

      Site: left antecubital;                                                                     

23:00 Follow up: Response: Med's dispensed home                                               ld5 

21:11 Drug: NS 0.9% 1000 ml [sodium chloride 0.9 % intravenous solution] Route: IV; Rate:     ld5 

      bolus; Site: left antecubital;                                                              

22:59 Follow up: IV Status: Completed infusion; IV Intake: 1000ml                             ld5 

22:59 Drug: HYDROcodone-acetaminophen 4 pack- 1 packets [hydrocodone 5 mg-acetaminophen 325   ld5 

      mg tablet (1 tabs)] {Co-Signature: [facility] (Anuradha Whipple RN).} Route: PO;                        

22:59 Follow up: Response: Med's dispensed home                                               ld5 

                                                                                                  

                                                                                                  

Signatures:                                                                                       

Dispatcher [employer]                           EDMS                                                 

Juan C Carr RN RN   cz                                                   

Shahla Qureshi, Reg                  Reg  gb                                                   

Alvino Machuca Aaron, PA-C PA-C ar2                                                  

Itzel Mayen RN RN   [facility]                                                  

Anuradha Whipple RN RN   [facility]                                                  

Anuradha Whipple RN                               [facility]                                                  

                                                                                                  

The chart was reviewed and I authenticate all verbal orders and agree with the evaluation and 
treatment provided.Attachments:

22:25 [facility] Payment Agreement                                                                zo  

                                                                                             

11:30 T-Sheet-- Draft Copy                                                                    gb  

                                                                                                  

**************************************************************************************************



*** Chart Complete ***
MTDD

## 2017-02-20 NOTE — EDDOCDS
Physician Documentation                                                                           

[facility]                                                                         

Name: Marjorie Graves                                                                             

Age: 25 yrs                                                                                       

Sex: Female                                                                                       

: 1991                                                                                   

MRN: [id number]                                                                                     

Arrival Date: 2017                                                                          

Time: 19:40                                                                                       

Account#: [id number]                                                                              

Bed I5 / M5                                                                                       

Private MD: [facility]                                                                       

Disposition:                                                                                      

17 22:44 Discharged to Home/Self Care. Impression: Hydronephrosis with renal and            

ureteral calculous obstruction - right 4mm UPJ stone with mild hydronephrosis.                  

- Condition is Stable.                                                                            

- Discharge Instructions: Kidney Stones.                                                          

- Prescriptions for Ibuprofen 800 mg Oral Tablet - take 1 tablet by ORAL route every 8            

hours As needed take with food; 30 tablet. Norco 5- 325 mg Oral Tablet - take 1                 

tablet by ORAL route every 6 hours As needed MDD: 4 tabs; 16 tablet. Flomax 0.4 mg              

Oral Capsule, Sust. Release 24 hr - take 1 capsule by ORAL route once daily 1/2 hour            

following the same meal each day; 15 capsule. ZOFRAN ODT 4 mg Oral - dissolve 1                 

tablet by ORAL route every 8 hours As needed do not chew, do not swallow whole; 10              

tablet.                                                                                         

- Medication Reconciliation, Local Pharmacy Hours form.                                           

- Follow up: Jazmin Adkins MD; When: Call to arrange an appointment; Reason: Recheck           

today's complaints, Continuance of care. Follow up: Emergency Department; When: As              

needed; Reason: Worsening of conditions.                                                        

- Problem is new.                                                                                 

- Symptoms have improved.                                                                         

- Notes: call monday for follow up appointment. drink plenty of fluids                            

                                                                                                  

                                                                                                  

Historical:                                                                                       

- Allergies: No known drug Allergies;                                                             

- Home Meds:                                                                                      

1. birth control                                                                                

- PMHx: none;                                                                                     

- PSHx: ;                                                                                

- Social history: Smoking status: Patient states was never smoker of tobacco. No                  

barriers to communication noted, The patient speaks fluent English, Speaks                      

appropriately for age.                                                                          

- Family history: Not pertinent.                                                                  

- : The pt / caregiver states he / she is not on anticoagulants. Home medication list             

is obtained from the patient.                                                                   

- Exposure Risk Screening:: None identified.                                                      

                                                                                                  

                                                                                                  

OB/GYN:                                                                                           

                                                                                             

19:52 no periods due to birth control pill                                                    cz  

                                                                                                  

Vital Signs:                                                                                      

19:42  / 71; Pulse 72; Resp 18 S; Temp 97.9(O); Pulse Ox 99% on R/A; Weight 53.52 kg /  gr2 

      117.99 lbs (R); Height 4 ft. 11 in. (149.86 cm) (R); Pain 9/10;                             

23:05  / 71; Pulse 74; Resp 16; Temp 97.8; Pulse Ox 100% on R/A; Pain 2/10;             ld5 

19:42 Body Mass Index 23.83 (53.52 kg, 149.86 cm)                                             gr2 

                                                                                                  

MDM:                                                                                              

19:57 UCG by Nursing ordered.                                                                 cz  

19:58 Urinalysis Ordered.                                                                     EDMS

19:58 Urine Culture Ordered.                                                                  EDMS

20:03 Undress patient appropriately for examination ordered.                                  ar2 

20:03 IV Saline Lock ordered.                                                                 ar2 

20:03 Ondansetron 4 mg IVP once ordered.                                                      ar2 

20:05 Amylase Ordered.                                                                        EDMS

20:05 Basic Metabolic Profile Ordered.                                                        EDMS

20:05 CBC with Diff Ordered.                                                                  EDMS

20:05 Lipase Ordered.                                                                         EDMS

20:05 Liver Profile Ordered.                                                                  EDMS

20:06 NOTHING BY MOUTH+DIET ordered.                                                          EDMS

20:42 ketorolac 30 mg IVP once ordered.                                                       ar2 

20:52 Urinalysis Reviewed.                                                                    ar2 

20:52 CBC with Diff Reviewed.                                                                 ar2 

20:52 Amylase Reviewed.                                                                       ar2 

20:52 Basic Metabolic Profile Reviewed.                                                       ar2 

20:52 Lipase Reviewed.                                                                        ar2 

20:52 Liver Profile Reviewed.                                                                 ar2 

20:53 NS 0.9% 1000 ml IV at bolus once ordered.                                               ar2 

20:53 CT ABD & PELVIS: No Contrast Ordered.                                                   EDMS

21:48 Financial registration complete.                                                        zo  

22:25 [facility] Payment Agreement was scanned into [employer] and attached to record.               zo  

22:42 Dispense Urine Strainer ordered.                                                        ar2 

22:47 HYDROcodone-acetaminophen 4 pack- 5 mg-325 mg 1 packets PO Per package directions;      ar2 

      Dispense with patient. 1 po q4h prn for pain ordered.                                       

22:48 CT ABD & PELVIS: No Contrast Reviewed.                                                  ar2

                                                                                             

11:30 T-Sheet-- Draft Copy was scanned into [employer] and attached to record.                   gb  

11:30 Radiology Report was scanned into [employer] and attached to record.                       gb  

                                                                                                  

Point of Care Testing:                                                                            

      Urine Pregnancy:                                                                            

                                                                                             

20:16 hCG Reading: Negative; Control Reading: Positive;                                       cln 

      Ranges:                                                                                     

                                                                                                  

Administered Medications:                                                                         

20:24 Drug: Ondansetron 4 mg [ondansetron HCl 2 mg/mL intravenous solution (2 mL)] Route:     ld5 

      IVP; Site: left antecubital;                                                                

23:05 Follow up: Response: Nausea is decreased                                                ld5 

20:48 Drug: ketorolac 30 mg [ketorolac 30 mg/mL (1 mL) injection solution (1 mL)] Route: IVP; ld5 

      Site: left antecubital;                                                                     

23:00 Follow up: Response: Med's dispensed home                                               ld5 

21:11 Drug: NS 0.9% 1000 ml [sodium chloride 0.9 % intravenous solution] Route: IV; Rate:     ld5 

      bolus; Site: left antecubital;                                                              

22:59 Follow up: IV Status: Completed infusion; IV Intake: 1000ml                             ld5 

22:59 Drug: HYDROcodone-acetaminophen 4 pack- 1 packets [hydrocodone 5 mg-acetaminophen 325   ld5 

      mg tablet (1 tabs)] {Co-Signature: [facility] (Anuradha Whipple RN).} Route: PO;                        

22:59 Follow up: Response: Med's dispensed home                                               ld5 

                                                                                                  

                                                                                                  

Signatures:                                                                                       

Dispatcher [employer]                           EDMS                                                 

Juan C Carr RN RN   cz                                                   

Shahla Qureshi, Reg                  Reg  gb                                                   

Alvino Machuca Aaron, PA-C PA-C ar2                                                  

Itzel Mayen RN RN   [facility]                                                  

Anuradha Whipple RN RN   [facility]                                                  

Anuradha Whipple RN                               [facility]                                                  

                                                                                                  

The chart was reviewed and I authenticate all verbal orders and agree with the evaluation and 
treatment provided.Attachments:

22:25 [facility] Payment Agreement                                                                zo  

                                                                                             

11:30 T-Sheet-- Draft Copy                                                                    gb  

                                                                                                  

**************************************************************************************************

MTDD

## 2017-02-20 NOTE — EDDOCDS
Nurse's Notes                                                                                     

[facility]                                                                         

Name: Marjorie Graves                                                                             

Age: 25 yrs                                                                                       

Sex: Female                                                                                       

: 1991                                                                                   

MRN: [id number]                                                                                     

Arrival Date: 2017                                                                          

Time: 19:40                                                                                       

Account#: [id number]                                                                              

Bed I5 / M5                                                                                       

Private MD: Libertad                                                                       

Diagnosis: Hydronephrosis with renal and ureteral calculous obstruction-right 4mm UPJ stone with  

mild hydronephrosis                                                                             

                                                                                                  

Presentation:                                                                                     

                                                                                             

19:50 Presenting complaint: Patient states: stomach pain since earlier today pain back side   cz  

      and worse now pt denies UTI symptoms. Acute neurological deficits are not present.          

      Mechanism of Injury: No Mechanism of Injury. Adult Sepsis Screening: The patient does       

      not have new or worsening altered mentation. Patient's respiratory rate is less than        

      22. Systolic blood pressure is greater than 100. Patient has a qSOFA score of 0-            

      Negative Sepsis Screen. Suicide/Homicide risk assessment- the patient denies having any     

      suicidal and/or homicidal ideations and does not present with any other emotional,          

      behavioral or mental health complaints.  Status: The patient is a           

      dependent. Transition of care: patient was not received from another setting of care.       

19:50 Acuity: TANG Level 3                                                                     cz  

19:50 Method Of Arrival: Walkin/Carried/Asstd                                                 cz  

                                                                                                  

Triage Assessment:                                                                                

19:52 General: Appears uncomfortable. Pain: Location: back Pain currently is 8 out of 10 on a cz  

      pain scale. Pt Declines HIV testing.                                                        

                                                                                                  

OB/GYN:                                                                                           

19:52 no periods due to birth control pill                                                    cz  

                                                                                                  

Historical:                                                                                       

- Allergies: No known drug Allergies;                                                             

- Home Meds:                                                                                      

1. birth control                                                                                

- PMHx: none;                                                                                     

- PSHx: ;                                                                                

- Social history: Smoking status: Patient states was never smoker of tobacco. No                  

barriers to communication noted, The patient speaks fluent English, Speaks                      

appropriately for age.                                                                          

- Family history: Not pertinent.                                                                  

- : The pt / caregiver states he / she is not on anticoagulants. Home medication list             

is obtained from the patient.                                                                   

- Exposure Risk Screening:: None identified.                                                      

                                                                                                  

                                                                                                  

Screenin:29 Screening information is obtained from the patient. Fall risk: No risks identified.     cjh 

      Assistance ADL's: requires no assistance with activities of daily living. Abuse/DV          

      Screen: The patient / caregiver reports he/she is: not in a situation that causes fear,     

      pain or injury. Nutritional screening: No deficits noted. Advance Directives: There is      

      no active DNR order. home support is adequate.                                              

                                                                                                  

Assessment:                                                                                       

20:29 General: Appears in no apparent distress, comfortable, Behavior is appropriate for age, cjh 

      cooperative. Pain: Location: posterior aspect of right lateral abdomen and anterior         

      aspect of right lateral abdomen Pain currently is 8 out of 10 on a pain scale.              

      Respiratory: Airway is patent Respiratory effort is even, unlabored, Respiratory            

      pattern is regular, symmetrical. GI: Abdomen is non- distended Bowel sounds present X 4     

      quads. Abd is soft and non tender X 4 quads. Musculoskeletal: Range of motion intact in     

      all extremities.                                                                            

21:10 General: Pt laying in bed talking on phone. States "I feel so much better". Bolus       ld5 

      started per orders. Will continue to monitor.                                               

21:53 General: returned from CT, tolerated well, awaiting results, friends at bedside, will   [facility] 

      continue to monitor.                                                                        

22:36 General: Pt ambulated to bathroom. Tolerated well. Reports minimal pain. Resting        ld5 

      comfortably in bed. Friends at bedside. Will continue to monitor.                           

23:05 General: Appears in no apparent distress, Behavior is cooperative. Pain: Pain currently ld5 

      is 2 out of 10 on a pain scale. Neurological: Level of Consciousness is awake, alert.       

      Respiratory: Airway is patent Respiratory effort is even, unlabored.                        

                                                                                                  

Vital Signs:                                                                                      

19:42  / 71; Pulse 72; Resp 18 S; Temp 97.9(O); Pulse Ox 99% on R/A; Weight 53.52 kg    gr2 

      (R); Height 4 ft. 11 in. (149.86 cm) (R); Pain 9/10;                                        

23:05  / 71; Pulse 74; Resp 16; Temp 97.8; Pulse Ox 100% on R/A; Pain 2/10;             ld5 

19:42 Body Mass Index 23.83 (53.52 kg, 149.86 cm)                                             gr2 

                                                                                                  

Vitals:                                                                                           

19:42 Log In Time: 2017 at 19:42.                                                gr2 

                                                                                                  

ED Course:                                                                                        

19:42 Patient visited by Kavon Scott.                                                   gr2 

19:42 Libertad is Private Physician.                                                   gr2 

19:42 Patient moved to Waiting                                                                gr2 

19:44 Patient visited by Kavon Scott.                                                   gr2 

19:44 Patient moved to Pre RCE                                                                gr2 

19:51 Triage Initiated                                                                        cz  

19:55 [facility]                                                        ar2 

19:55 Patient moved to Triage 2                                                               cz  

19:56 David Rhoades DO is Attending Physician.                                            ar2 

19:56 Patient visited by Reji Rudolph PA-C.                                             ar2 

20:06 Patient moved to I5 / M5                                                                cz  

20:16 Patient visited by Joan Nichols PCA.                                               cln 

20:16 Urinalysis Sent.                                                                        cln 

20:16 Urine Culture Sent.                                                                     cln 

20:29 The patient / caregiver is instructed regarding the plan of care and ED course.         [facility] 

20:29 Inserted saline lock: 20 gauge in left antecubital area and blood collected. No         [facility] 

      procedures done that require assistance. Labs drawn. (by ED staff). Sent per order to       

      lab.                                                                                        

20:48 Patient visited by Itzel Mayen RN.                                                  ld5 

21:11 Patient visited by Itzel Mayen RN.                                                  ld5 

21:54 Patient visited by Anuradha Whipple RN.                                                      cjh 

22:12 CT ABD & PELVIS: No Contrast Returned.                                                  EDMS

22:25 [facility] Payment Agreement was scanned into [employer] and attached to record.               zo  

22:37 Patient visited by Itzel Mayen RN.                                                  ld5 

22:43 Jazmin Adkins MD is Referral Physician.                                              ar2 

23:05 Discontinued lock intact, bleeding controlled, pressure dressing applied, No            ld5 

      redness/swelling at site.                                                                   

23:07 Patient visited by Itzel Mayen RN.                                                  ld5 

                                                                                             

11:30 T-Sheet-- Draft Copy was scanned into [employer] and attached to record.                   gb  

11:30 Radiology Report was scanned into [employer] and attached to record.                       gb  

                                                                                                  

Administered Medications:                                                                         

                                                                                             

20:24 Drug: Ondansetron 4 mg [ondansetron HCl 2 mg/mL intravenous solution (2 mL)] Route:     ld5 

      IVP; Site: left antecubital;                                                                

23:05 Follow up: Response: Nausea is decreased                                                ld5 

20:48 Drug: ketorolac 30 mg [ketorolac 30 mg/mL (1 mL) injection solution (1 mL)] Route: IVP; ld5 

      Site: left antecubital;                                                                     

23:00 Follow up: Response: Med's dispensed home                                               ld5 

21:11 Drug: NS 0.9% 1000 ml [sodium chloride 0.9 % intravenous solution] Route: IV; Rate:     ld5 

      bolus; Site: left antecubital;                                                              

22:59 Follow up: IV Status: Completed infusion; IV Intake: 1000ml                             ld5 

22:59 Drug: HYDROcodone-acetaminophen 4 pack- 1 packets [hydrocodone 5 mg-acetaminophen 325   ld5 

      mg tablet (1 tabs)] {Co-Signature: [facility] (Anuradha Whipple RN).} Route: PO;                        

22:59 Follow up: Response: Med's dispensed home                                               ld5 

                                                                                                  

                                                                                                  

Point of Care Testing:                                                                            

      Urine Pregnancy:                                                                            

20:16 hCG Reading: Negative; Control Reading: Positive;                                       cln 

      Ranges:                                                                                     

                                                                                                  

Intake:                                                                                           

22:59 IV: 1000.00ml; Total: 1000.00ml.                                                        ld5 

                                                                                                  

Order Results:                                                                                    

Lab Order: Urine Culture; SPEC'M 17 19:58                                                   

      Test: URINE CULTURE; Value: <EXTERNAL COMMENT eCWMed> FULL REPORT IN LAB NOTES (eCW and     

      Medent).; Status: F                                                                         

      Test: URINE CULTURE; Value: ORGANISM 1: PROTEUS MIRABILIS; Status: F                        

      Test: URINE CULTURE; Value: PROTEUS MIRABILIS; Status: F                                    

      Test: URINE CULTURE; Value: COLONY COUNT CFU/ml >100,000; Status: F                         

      Test: URINE CULTURE; Value: GRAM NEG SENSI - VITEK 80; Status: F                            

      Test: URINE CULTURE; Value: Method: VIT2; Status: F                                         

      Test: URINE CULTURE; Value: TRIMETHOPRIM/SULFAMETHOXAZOLE >=320 R; Status: F                

      Test: URINE CULTURE; Value: AMPICILLIN <=2 S; Status: F                                     

      Test: URINE CULTURE; Value: GENTAMICIN <=1 S; Status: F                                     

      Test: URINE CULTURE; Value: NITROFURANTOIN 128 R; Status: F                                 

      Test: URINE CULTURE; Value: CEFAZOLIN <=4 S; Status: F                                      

      Test: URINE CULTURE; Value: LEVOFLOXACIN <=0.12 S; Status: F                                

      Test: URINE CULTURE; Value: TOBRAMYCIN <=1 S; Status: F                                     

      Test: URINE CULTURE; Value: CEFTRIAXONE <=1 S; Status: F                                    

      Test: URINE CULTURE; Value: CEFTAZIDIME <=1 S; Status: F                                    

      Test: URINE CULTURE; Value: AMPICILLIN/SULBACTAM <=2 S; Status: F                           

      Test: URINE CULTURE; Value: PIPERACILLIN/TAZOBACTAM <=4 S; Status: F                        

      Test: URINE CULTURE; Value: AZTREONAM <=1 S; Status: F                                      

      Test: URINE CULTURE; Value: ERTAPENEM <=0.5 S; Status: F                                    

      Test: URINE CULTURE; Value: MEROPENEM <=0.25 S; Status: F                                   

      Test: URINE CULTURE; Value: TIGECYCLINE 4 R; Status: F                                      

      Test: URINE CULTURE; Value: CEFEPIME <=1 S; Status: F                                       

Lab Order: Urinalysis; SPEC'M 17 19:58                                                      

      Test: APPEARANCE, URINE; Value: CLEAR; Range: CLEAR; Status: F                              

      Test: COLOR, URINE; Value: YELLOW; Range: YELLOW; Status: F                                 

      Test: PH,URINE; Value: 8.0; Range: 5.0-9.0; Units: UNITS; Status: F                         

      Test: SPECIFIC GRAVITY URINE AUTO; Value: 1.018; Range: 1.002-1.035; Status: F              

      Test: PROTEIN, URINE AUTO; Value: 1+; Range: NEGATIVE; Abnormal: Above high normal;         

      Units: mg/dL; Status: F                                                                     

      Test: GLUCOSE, URINE (UA) AUTO; Value: NEGATIVE; Range: NEGATIVE; Units: mg/dL; Status:     

      F                                                                                           

      Test: KETONE, URINE AUTO; Value: NEGATIVE; Range: NEGATIVE; Units: mg/dL; Status: F         

      Test: UROBILINOGEN, URINE AUTO; Value: 0.2; Range: 0.0-2.0; Units: mg/dL; Status: F         

      Test: BILIRUBIN, URINE AUTO; Value: NEGATIVE; Range: NEGATIVE; Status: F                    

      Test: NITRITE, URINE AUTO; Value: NEGATIVE; Range: NEGATIVE; Status: F                      

      Test: LEUKOCYTE ESTERASE, URINE AUTO; Value: TRACE; Range: NEGATIVE; Abnormal: Above        

      high normal; Status: F                                                                      

      Test: BLOOD, URINE BLOOD; Value: 3+; Range: NEGATIVE; Abnormal: Above high normal;          

      Status: F                                                                                   

      Test: WBC, URINE AUTO; Value: 13; Range: 0-3; Abnormal: Above high normal; Units: /HPF;     

      Status: F                                                                                   

      Test: RBC, URINE AUTO; Value: TNTC; Range: 0-3; Abnormal: Above high normal; Units:         

      /HPF; Status: F                                                                             

      Test: BACTERIA, URINE AUTO; Value: 1+; Range: NEGATIVE; Abnormal: Above high normal;        

      Status: F                                                                                   

      Test: SQUAMOUS EPITHELIAL CELL UR AU; Value: 1; Range: 0-6; Units: /HPF; Status: F          

      Test: MUCUS, URINE; Value: SMALL; Range: NEGATIVE; Status: F                                

      Test: HYALINE CAST, URINE AUTO; Value: 0; Range: 0-1; Units: /LPF; Status: F                

      Test: AMORPHOUS SEDIMENT; Value: SMALL; Range: NEGATIVE; Abnormal: Above high normal;       

      Status: F                                                                                   

Lab Order: Amylase; SPEC'17 20:19                                                         

      Test: AMYLASE; Value: 65; Range: ; Units: U/L; Status: F                              

Lab Order: Basic Metabolic Profile; SPEC'17 20:19                                         

      Test: GLUCOSE, FASTING; Value: 87; Range: ; Units: MG/DL; Status: F                   

      Test: BLOOD UREA NITROGEN; Value: 8; Range: 7-18; Units: MG/DL; Status: F                   

      Test: CREATININE FOR GFR; Value: 0.92; Range: 0.55-1.02; Units: MG/DL; Status: F            

      Test: GLOMERULAR FILTRATION RATE; Value: > 60.0; Range: >60; Status: F                      

      Test: SODIUM LEVEL; Value: 142; Range: 136-145; Units: MEQ/L; Status: F                     

      Test: POTASSIUM SERUM; Value: 3.8; Range: 3.5-5.1; Units: MEQ/L; Status: F                  

      Test: CHLORIDE LEVEL; Value: 106; Range: ; Units: MEQ/L; Status: F                    

      Test: CARBON DIOXIDE LEVEL; Value: 28; Range: 21-32; Units: MEQ/L; Status: F                

      Test: ANION GAP; Value: 8; Range: 8-16; Units: MEQ/L; Status: F                             

      Test: CALCIUM LEVEL; Value: 9.0; Range: 8.5-10.1; Units: MG/DL; Status: F                   

      Test Note: &nbsp;; Units are mL/min/1.73 m2 Chronic Kidney Disease Staging per NKF:       

      Stage I & II GFR >=60 Normal to Mildly Decreased Stage III GFR 30-59 Moderately           

      Decreased Stage IV GFR 15-29 Severely Decreased Stage V GFR <15 Very Little GFR Left        

      ESRD GFR <15 on RRT                                                                         

Lab Order: CBC with Diff; SPEC'17 20:19                                                   

      Test: WHITE BLOOD COUNT; Value: 8.6; Range: 4.0-10.0; Units: K/mm3; Status: F               

      Test: RED BLOOD COUNT; Value: 4.67; Range: 4.00-5.40; Units: M/mm3; Status: F               

      Test: HEMOGLOBIN; Value: 13.8; Range: 12.0-16.0; Units: g/dl; Status: F                     

      Test: HEMATOCRIT; Value: 42.3; Range: 36.0-47.0; Units: %; Status: F                        

      Test: MEAN CORPUSCULAR VOLUME; Value: 90.5; Range: 80.0-96.0; Units: fl; Status: F          

      Test: MEAN CORPUSCULAR HEMOGLOBIN; Value: 29.6; Range: 27.0-33.0; Units: pg; Status: F      

      Test: MEAN CORPUSCULAR HGB CONC; Value: 32.8; Range: 32.0-36.5; Units: g/dl; Status: F      

      Test: RED CELL DISTRIBUTION WIDTH; Value: 13.2; Range: 11.5-14.5; Units: %; Status: F       

      Test: PLATELET COUNT, AUTOMATED; Value: 208; Range: 150-450; Units: k/mm3; Status: F        

      Test: NEUTROPHILS %; Value: 61.4; Range: 36.0-66.0; Units: %; Status: F                     

      Test: LYMPH %; Value: 28.8; Range: 24.0-44.0; Units: %; Status: F                           

      Test: MONO %; Value: 5.1; Range: 0.0-5.0; Abnormal: Above high normal; Units: %;            

      Status: F                                                                                   

      Test: EOS %; Value: 1.0; Range: 0.0-3.0; Units: %; Status: F                                

      Test: BASO %; Value: 0.5; Range: 0.0-1.0; Units: %; Status: F                               

      Test: LARGE UNSTAINED CELL %; Value: 3.0; Range: 0.0-4.0; Units: %; Status: F               

      Test: NEUTROPHILS #; Value: 5.3; Range: 1.8-7.7; Units: K/mm3; Status: F                    

      Test: LYMPH #; Value: 2.5; Range: 1.5-6.5; Units: K/mm3; Status: F                          

      Test: MONO #; Value: 0.4; Range: 0.0-0.8; Units: K/mm3; Status: F                           

      Test: EOS #; Value: 0.1; Range: 0.0-0.50; Units: K/mm3; Status: F                           

      Test: BASO #; Value: 0.0; Range: 0.0-0.2; Units: K/mm3; Status: F                           

      Test: LARGE UNSTAINED CELL #; Value: 0.3; Range: 0.0-0.4; Units: K/mm3; Status: F           

Lab Order: Lipase; SPEC' 17 20:19                                                          

      Test: LIPASE; Value: 96; Range: ; Units: U/L; Status: F                               

Lab Order: Liver Profile; SPEC' 17 20:19                                                   

      Test: AST/SGOT; Value: 15; Range: 15-37; Units: U/L; Status: F                              

      Test: ALT/SGPT; Value: 20; Range: 12-78; Units: U/L; Status: F                              

      Test: ALKALINE PHOSPHATASE; Value: 66; Range: ; Units: U/L; Status: F                 

      Test: BILIRUBIN,TOTAL; Value: 0.2; Range: 0.2-1.0; Units: MG/DL; Status: F                  

      Test: BILIRUBIN,DIRECT; Value: < 0.1; Range: 0.0-0.2; Units: MG/DL; Status: F               

      Test: TOTAL PROTEIN; Value: 6.8; Range: 6.4-8.2; Units: GM/DL; Status: F                    

      Test: ALBUMIN; Value: 3.5; Range: 3.2-5.2; Units: GM/DL; Status: F                          

      Test: ALBUMIN/GLOBULIN RATIO; Value: 1.06; Range: 1.00-1.93; Status: F                      

                                                                                                  

Radiology Order: CT ABD & PELVIS: No Contrast                                                   

      Test: CT ABD & PELVIS: No Contrast                                                        

      REASON FOR EXAMINATION: right renal colic; ; CT of the abdomen and pelvis without           

      contrast; Clinical statement: Pain.; Technique: Multiple axial CT images were obtained      

      from the base of the lungs to the floor of the pelv; is utilizing 5 mm axial slices         

      without administration of contrast. Coronal and sagittal reconstructio; ns were also        

      obtained.; No comparison is available.; Findings:; Chest: The visualized lung bases are     

      clear.; Abdomen: The kidneys are normal in size bilaterally. There is moderate              

      right-sided hydronephrosis cau; sed by a 4 mm obstructing stone at the right                

      ureteropelvic junction. The liver, spleen, pancreas, gal; lbladder and adrenal glands       

      are unremarkable. The aorta demonstrates normal caliber and contour. Ther; e is no          

      abdominal lymphadenopathy or ascites.; Pelvis: The bowel is unremarkable, with no           

      obstructive or inflammatory changes. The appendix is simone; l. The urinary bladder is       

      within normal limits. There is no pelvic lymphadenopathy or ascites. The ot; her pelvic     

      structures appear unremarkable.; Bones: There are no suspicious osseous abnormalities       

      seen.; Impression: Mild right-sided hydronephrosis caused by a 4 mm obstructing stone       

      at the right ureterope; lvic junction.; Electronically signed by JODI CAMARILLO MD on     

      2017 10:05:26 PM ET;                                                                  

Outcome:                                                                                          

22:44 Discharge ordered by Provider.                                                          ar2 

23:05 Discharge Assessment: Patient awake, alert and oriented x 3. No cognitive and/or        ld5 

      functional deficits noted. Patient verbalized understanding of disposition                  

      instructions. patient administered narcotics - no. The following High Risk Discharge        

      criteria are identified: None. Discharged to home ambulatory, with friend. Condition:       

      stable. Discharge instructions given to patient, Instructed on discharge instructions,      

      follow up and referral plans. medication usage, no driving heavy equipment,                 

      Demonstrated understanding of instructions, medications, Pt was receptive of discharge      

      instructions/ teaching. Prescriptions given X 4. CT Study completed. Property :Personal     

      belongings accompany Pt.                                                                    

23:07 Patient left the ED.                                                                    ld5 

                                                                                                  

Addendum:                                                                                         

2017                                                                                        

     10:39 Narrative: Urine culture results reviewed with Dr. Griffin and Rx written for Keflex    
kcs

           500 mg po QID x 7 days. Message left for patient to call us concerning where she would 

           like Rx called to.                                                                     

                                                                                                  

Signatures:                                                                                       

Dispatcher [employer]                           EDMS                                                 

Stacy Hines RN RN kcs Zecher, Calvin, RN RN cz Barnhardt, Gloria, Babar                  Reg  Alvino Ferrara Aaron, PA-C                 PA-C ar2                                                  

Itzel Mayen RN RN   ld5                                                  

Anuradha Whipple RN RN   [facility]                                                  

Kavon Scott                            gr2                                                  

Joan Nichols, PCA                   PCA  cln                                                  

Anuradha Whipple RN                               [facility]                                                  

                                                                                                  

**************************************************************************************************

MTDD

## 2017-02-20 NOTE — EDDOCDS
Physician Documentation                                                                           

[facility]                                                                         

Name: Marjorie Graves                                                                             

Age: 25 yrs                                                                                       

Sex: Female                                                                                       

: 1991                                                                                   

MRN: [id number]                                                                                     

Arrival Date: 2017                                                                          

Time: 19:40                                                                                       

Account#: [id number]                                                                              

Bed I5 / M5                                                                                       

Private MD: Libertad                                                                       

Disposition:                                                                                      

17 22:44 Discharged to Home/Self Care. Impression: Hydronephrosis with renal and            

ureteral calculous obstruction - right 4mm UPJ stone with mild hydronephrosis.                  

- Condition is Stable.                                                                            

- Discharge Instructions: Kidney Stones.                                                          

- Prescriptions for Ibuprofen 800 mg Oral Tablet - take 1 tablet by ORAL route every 8            

hours As needed take with food; 30 tablet. Norco 5- 325 mg Oral Tablet - take 1                 

tablet by ORAL route every 6 hours As needed MDD: 4 tabs; 16 tablet. Flomax 0.4 mg              

Oral Capsule, Sust. Release 24 hr - take 1 capsule by ORAL route once daily 1/2 hour            

following the same meal each day; 15 capsule. ZOFRAN ODT 4 mg Oral - dissolve 1                 

tablet by ORAL route every 8 hours As needed do not chew, do not swallow whole; 10              

tablet.                                                                                         

- Medication Reconciliation, Local Pharmacy Hours form.                                           

- Follow up: Jazmin Adkins MD; When: Call to arrange an appointment; Reason: Recheck           

today's complaints, Continuance of care. Follow up: Emergency Department; When: As              

needed; Reason: Worsening of conditions.                                                        

- Problem is new.                                                                                 

- Symptoms have improved.                                                                         

- Notes: call monday for follow up appointment. drink plenty of fluids                            

                                                                                                  

                                                                                                  

Historical:                                                                                       

- Allergies: No known drug Allergies;                                                             

- Home Meds:                                                                                      

1. birth control                                                                                

- PMHx: none;                                                                                     

- PSHx: ;                                                                                

- Social history: Smoking status: Patient states was never smoker of tobacco. No                  

barriers to communication noted, The patient speaks fluent English, Speaks                      

appropriately for age.                                                                          

- Family history: Not pertinent.                                                                  

- : The pt / caregiver states he / she is not on anticoagulants. Home medication list             

is obtained from the patient.                                                                   

- Exposure Risk Screening:: None identified.                                                      

                                                                                                  

                                                                                                  

OB/GYN:                                                                                           

                                                                                             

19:52 no periods due to birth control pill                                                    cz  

                                                                                                  

Vital Signs:                                                                                      

19:42  / 71; Pulse 72; Resp 18 S; Temp 97.9(O); Pulse Ox 99% on R/A; Weight 53.52 kg /  gr2 

      117.99 lbs (R); Height 4 ft. 11 in. (149.86 cm) (R); Pain 9/10;                             

23:05  / 71; Pulse 74; Resp 16; Temp 97.8; Pulse Ox 100% on R/A; Pain 2/10;             ld5 

19:42 Body Mass Index 23.83 (53.52 kg, 149.86 cm)                                             gr2 

                                                                                                  

MDM:                                                                                              

19:57 UCG by Nursing ordered.                                                                 cz  

19:58 Urinalysis Ordered.                                                                     EDMS

19:58 Urine Culture Ordered.                                                                  EDMS

20:03 Undress patient appropriately for examination ordered.                                  ar2 

20:03 IV Saline Lock ordered.                                                                 ar2 

20:03 Ondansetron 4 mg IVP once ordered.                                                      ar2 

20:05 Amylase Ordered.                                                                        EDMS

20:05 Basic Metabolic Profile Ordered.                                                        EDMS

20:05 CBC with Diff Ordered.                                                                  EDMS

20:05 Lipase Ordered.                                                                         EDMS

20:05 Liver Profile Ordered.                                                                  EDMS

20:06 NOTHING BY MOUTH+DIET ordered.                                                          EDMS

20:42 ketorolac 30 mg IVP once ordered.                                                       ar2 

20:52 Urinalysis Reviewed.                                                                    ar2 

20:52 CBC with Diff Reviewed.                                                                 ar2 

20:52 Amylase Reviewed.                                                                       ar2 

20:52 Basic Metabolic Profile Reviewed.                                                       ar2 

20:52 Lipase Reviewed.                                                                        ar2 

20:52 Liver Profile Reviewed.                                                                 ar2 

20:53 NS 0.9% 1000 ml IV at bolus once ordered.                                               ar2 

20:53 CT ABD & PELVIS: No Contrast Ordered.                                                   EDMS

21:48 Financial registration complete.                                                        zo  

22:25 [facility] Payment Agreement was scanned into [employer] and attached to record.               zo  

22:42 Dispense Urine Strainer ordered.                                                        ar2 

22:47 HYDROcodone-acetaminophen 4 pack- 5 mg-325 mg 1 packets PO Per package directions;      ar2 

      Dispense with patient. 1 po q4h prn for pain ordered.                                       

22:48 CT ABD & PELVIS: No Contrast Reviewed.                                                  ar2

                                                                                             

11:30 T-Sheet-- Draft Copy was scanned into [employer] and attached to record.                   gb  

11:30 Radiology Report was scanned into [employer] and attached to record.                       gb  

                                                                                                  

Point of Care Testing:                                                                            

      Urine Pregnancy:                                                                            

                                                                                             

20:16 hCG Reading: Negative; Control Reading: Positive;                                       cln 

      Ranges:                                                                                     

                                                                                                  

Administered Medications:                                                                         

20:24 Drug: Ondansetron 4 mg [ondansetron HCl 2 mg/mL intravenous solution (2 mL)] Route:     ld5 

      IVP; Site: left antecubital;                                                                

23:05 Follow up: Response: Nausea is decreased                                                ld5 

20:48 Drug: ketorolac 30 mg [ketorolac 30 mg/mL (1 mL) injection solution (1 mL)] Route: IVP; ld5 

      Site: left antecubital;                                                                     

23:00 Follow up: Response: Med's dispensed home                                               ld5 

21:11 Drug: NS 0.9% 1000 ml [sodium chloride 0.9 % intravenous solution] Route: IV; Rate:     ld5 

      bolus; Site: left antecubital;                                                              

22:59 Follow up: IV Status: Completed infusion; IV Intake: 1000ml                             ld5 

22:59 Drug: HYDROcodone-acetaminophen 4 pack- 1 packets [hydrocodone 5 mg-acetaminophen 325   ld5 

      mg tablet (1 tabs)] {Co-Signature: [facility] (Anuradha Whipple RN).} Route: PO;                        

22:59 Follow up: Response: Med's dispensed home                                               ld5 

                                                                                                  

                                                                                                  

Signatures:                                                                                       

Dispatcher [employer]                           EDMS                                                 

Juan C Carr RN RN   cz                                                   

Shahla Qureshi, Reg                  Reg  gb                                                   

Alvino Macuhca Aaron, PA-C PA-C ar2                                                  

Itzel Mayen RN RN   [facility]                                                  

Anuradha Whipple RN RN   [facility]                                                  

Anuradha Whipple RN                               [facility]                                                  

                                                                                                  

The chart was reviewed and I authenticate all verbal orders and agree with the evaluation and 
treatment provided.Attachments:

22:25 [facility] Payment Agreement                                                                zo  

                                                                                             

11:30 T-Sheet-- Draft Copy                                                                    gb  

                                                                                                  

**************************************************************************************************

MTDD

## 2017-02-20 NOTE — EDDOCDS
Nurse's Notes                                                                                     

[facility]                                                                         

Name: Marjorie Graves                                                                             

Age: 25 yrs                                                                                       

Sex: Female                                                                                       

: 1991                                                                                   

MRN: [id number]                                                                                     

Arrival Date: 2017                                                                          

Time: 19:40                                                                                       

Account#: [id number]                                                                              

Bed I5 / M5                                                                                       

Private MD: Libertad                                                                       

Diagnosis: Hydronephrosis with renal and ureteral calculous obstruction-right 4mm UPJ stone with  

mild hydronephrosis                                                                             

                                                                                                  

Presentation:                                                                                     

                                                                                             

19:50 Presenting complaint: Patient states: stomach pain since earlier today pain back side   cz  

      and worse now pt denies UTI symptoms. Acute neurological deficits are not present.          

      Mechanism of Injury: No Mechanism of Injury. Adult Sepsis Screening: The patient does       

      not have new or worsening altered mentation. Patient's respiratory rate is less than        

      22. Systolic blood pressure is greater than 100. Patient has a qSOFA score of 0-            

      Negative Sepsis Screen. Suicide/Homicide risk assessment- the patient denies having any     

      suicidal and/or homicidal ideations and does not present with any other emotional,          

      behavioral or mental health complaints.  Status: The patient is a           

      dependent. Transition of care: patient was not received from another setting of care.       

19:50 Acuity: TANG Level 3                                                                     cz  

19:50 Method Of Arrival: Walkin/Carried/Asstd                                                 cz  

                                                                                                  

Triage Assessment:                                                                                

19:52 General: Appears uncomfortable. Pain: Location: back Pain currently is 8 out of 10 on a cz  

      pain scale. Pt Declines HIV testing.                                                        

                                                                                                  

OB/GYN:                                                                                           

19:52 no periods due to birth control pill                                                    cz  

                                                                                                  

Historical:                                                                                       

- Allergies: No known drug Allergies;                                                             

- Home Meds:                                                                                      

1. birth control                                                                                

- PMHx: none;                                                                                     

- PSHx: ;                                                                                

- Social history: Smoking status: Patient states was never smoker of tobacco. No                  

barriers to communication noted, The patient speaks fluent English, Speaks                      

appropriately for age.                                                                          

- Family history: Not pertinent.                                                                  

- : The pt / caregiver states he / she is not on anticoagulants. Home medication list             

is obtained from the patient.                                                                   

- Exposure Risk Screening:: None identified.                                                      

                                                                                                  

                                                                                                  

Screenin:29 Screening information is obtained from the patient. Fall risk: No risks identified.     cjh 

      Assistance ADL's: requires no assistance with activities of daily living. Abuse/DV          

      Screen: The patient / caregiver reports he/she is: not in a situation that causes fear,     

      pain or injury. Nutritional screening: No deficits noted. Advance Directives: There is      

      no active DNR order. home support is adequate.                                              

                                                                                                  

Assessment:                                                                                       

20:29 General: Appears in no apparent distress, comfortable, Behavior is appropriate for age, cjh 

      cooperative. Pain: Location: posterior aspect of right lateral abdomen and anterior         

      aspect of right lateral abdomen Pain currently is 8 out of 10 on a pain scale.              

      Respiratory: Airway is patent Respiratory effort is even, unlabored, Respiratory            

      pattern is regular, symmetrical. GI: Abdomen is non- distended Bowel sounds present X 4     

      quads. Abd is soft and non tender X 4 quads. Musculoskeletal: Range of motion intact in     

      all extremities.                                                                            

21:10 General: Pt laying in bed talking on phone. States "I feel so much better". Bolus       ld5 

      started per orders. Will continue to monitor.                                               

21:53 General: returned from CT, tolerated well, awaiting results, friends at bedside, will   [facility] 

      continue to monitor.                                                                        

22:36 General: Pt ambulated to bathroom. Tolerated well. Reports minimal pain. Resting        ld5 

      comfortably in bed. Friends at bedside. Will continue to monitor.                           

23:05 General: Appears in no apparent distress, Behavior is cooperative. Pain: Pain currently ld5 

      is 2 out of 10 on a pain scale. Neurological: Level of Consciousness is awake, alert.       

      Respiratory: Airway is patent Respiratory effort is even, unlabored.                        

                                                                                                  

Vital Signs:                                                                                      

19:42  / 71; Pulse 72; Resp 18 S; Temp 97.9(O); Pulse Ox 99% on R/A; Weight 53.52 kg    gr2 

      (R); Height 4 ft. 11 in. (149.86 cm) (R); Pain 9/10;                                        

23:05  / 71; Pulse 74; Resp 16; Temp 97.8; Pulse Ox 100% on R/A; Pain 2/10;             ld5 

19:42 Body Mass Index 23.83 (53.52 kg, 149.86 cm)                                             gr2 

                                                                                                  

Vitals:                                                                                           

19:42 Log In Time: 2017 at 19:42.                                                gr2 

                                                                                                  

ED Course:                                                                                        

19:42 Patient visited by Kavon Scott.                                                   gr2 

19:42 Libertad is Private Physician.                                                   gr2 

19:42 Patient moved to Waiting                                                                gr2 

19:44 Patient visited by Kavon Scott.                                                   gr2 

19:44 Patient moved to Pre RCE                                                                gr2 

19:51 Triage Initiated                                                                        cz  

19:55 [facility]                                                        ar2 

19:55 Patient moved to Triage 2                                                               cz  

19:56 David Rhoades DO is Attending Physician.                                            ar2 

19:56 Patient visited by Reji Rudolph PA-C.                                             ar2 

20:06 Patient moved to I5 / M5                                                                cz  

20:16 Patient visited by Joan Nichols PCA.                                               cln 

20:16 Urinalysis Sent.                                                                        cln 

20:16 Urine Culture Sent.                                                                     cln 

20:29 The patient / caregiver is instructed regarding the plan of care and ED course.         [facility] 

20:29 Inserted saline lock: 20 gauge in left antecubital area and blood collected. No         [facility] 

      procedures done that require assistance. Labs drawn. (by ED staff). Sent per order to       

      lab.                                                                                        

20:48 Patient visited by Itzel Mayen RN.                                                  ld5 

21:11 Patient visited by Itzel Mayen RN.                                                  ld5 

21:54 Patient visited by Anuradha Whipple RN.                                                      cjh 

22:12 CT ABD & PELVIS: No Contrast Returned.                                                  EDMS

22:25 [facility] Payment Agreement was scanned into [employer] and attached to record.               zo  

22:37 Patient visited by Itzel Mayen RN.                                                  ld5 

22:43 Jazmin Adkins MD is Referral Physician.                                              ar2 

23:05 Discontinued lock intact, bleeding controlled, pressure dressing applied, No            ld5 

      redness/swelling at site.                                                                   

23:07 Patient visited by Itzel Mayen RN.                                                  ld5 

                                                                                             

11:30 T-Sheet-- Draft Copy was scanned into [employer] and attached to record.                   gb  

11:30 Radiology Report was scanned into [employer] and attached to record.                       gb  

                                                                                                  

Administered Medications:                                                                         

                                                                                             

20:24 Drug: Ondansetron 4 mg [ondansetron HCl 2 mg/mL intravenous solution (2 mL)] Route:     ld5 

      IVP; Site: left antecubital;                                                                

23:05 Follow up: Response: Nausea is decreased                                                ld5 

20:48 Drug: ketorolac 30 mg [ketorolac 30 mg/mL (1 mL) injection solution (1 mL)] Route: IVP; ld5 

      Site: left antecubital;                                                                     

23:00 Follow up: Response: Med's dispensed home                                               ld5 

21:11 Drug: NS 0.9% 1000 ml [sodium chloride 0.9 % intravenous solution] Route: IV; Rate:     ld5 

      bolus; Site: left antecubital;                                                              

22:59 Follow up: IV Status: Completed infusion; IV Intake: 1000ml                             ld5 

22:59 Drug: HYDROcodone-acetaminophen 4 pack- 1 packets [hydrocodone 5 mg-acetaminophen 325   ld5 

      mg tablet (1 tabs)] {Co-Signature: [facility] (Anuradha Whipple RN).} Route: PO;                        

22:59 Follow up: Response: Med's dispensed home                                               ld5 

                                                                                                  

                                                                                                  

Point of Care Testing:                                                                            

      Urine Pregnancy:                                                                            

20:16 hCG Reading: Negative; Control Reading: Positive;                                       cln 

      Ranges:                                                                                     

                                                                                                  

Intake:                                                                                           

22:59 IV: 1000.00ml; Total: 1000.00ml.                                                        ld5 

                                                                                                  

Order Results:                                                                                    

Lab Order: Urine Culture; SPEC'M 17 19:58                                                   

      Test: URINE CULTURE; Value: <EXTERNAL COMMENT eCWMed> FULL REPORT IN LAB NOTES (eCW and     

      Medent).; Status: F                                                                         

      Test: URINE CULTURE; Value: ORGANISM 1: PROTEUS MIRABILIS; Status: F                        

      Test: URINE CULTURE; Value: PROTEUS MIRABILIS; Status: F                                    

      Test: URINE CULTURE; Value: COLONY COUNT CFU/ml >100,000; Status: F                         

      Test: URINE CULTURE; Value: GRAM NEG SENSI - VITEK 80; Status: F                            

      Test: URINE CULTURE; Value: Method: VIT2; Status: F                                         

      Test: URINE CULTURE; Value: TRIMETHOPRIM/SULFAMETHOXAZOLE >=320 R; Status: F                

      Test: URINE CULTURE; Value: AMPICILLIN <=2 S; Status: F                                     

      Test: URINE CULTURE; Value: GENTAMICIN <=1 S; Status: F                                     

      Test: URINE CULTURE; Value: NITROFURANTOIN 128 R; Status: F                                 

      Test: URINE CULTURE; Value: CEFAZOLIN <=4 S; Status: F                                      

      Test: URINE CULTURE; Value: LEVOFLOXACIN <=0.12 S; Status: F                                

      Test: URINE CULTURE; Value: TOBRAMYCIN <=1 S; Status: F                                     

      Test: URINE CULTURE; Value: CEFTRIAXONE <=1 S; Status: F                                    

      Test: URINE CULTURE; Value: CEFTAZIDIME <=1 S; Status: F                                    

      Test: URINE CULTURE; Value: AMPICILLIN/SULBACTAM <=2 S; Status: F                           

      Test: URINE CULTURE; Value: PIPERACILLIN/TAZOBACTAM <=4 S; Status: F                        

      Test: URINE CULTURE; Value: AZTREONAM <=1 S; Status: F                                      

      Test: URINE CULTURE; Value: ERTAPENEM <=0.5 S; Status: F                                    

      Test: URINE CULTURE; Value: MEROPENEM <=0.25 S; Status: F                                   

      Test: URINE CULTURE; Value: TIGECYCLINE 4 R; Status: F                                      

      Test: URINE CULTURE; Value: CEFEPIME <=1 S; Status: F                                       

Lab Order: Urinalysis; SPEC'M 17 19:58                                                      

      Test: APPEARANCE, URINE; Value: CLEAR; Range: CLEAR; Status: F                              

      Test: COLOR, URINE; Value: YELLOW; Range: YELLOW; Status: F                                 

      Test: PH,URINE; Value: 8.0; Range: 5.0-9.0; Units: UNITS; Status: F                         

      Test: SPECIFIC GRAVITY URINE AUTO; Value: 1.018; Range: 1.002-1.035; Status: F              

      Test: PROTEIN, URINE AUTO; Value: 1+; Range: NEGATIVE; Abnormal: Above high normal;         

      Units: mg/dL; Status: F                                                                     

      Test: GLUCOSE, URINE (UA) AUTO; Value: NEGATIVE; Range: NEGATIVE; Units: mg/dL; Status:     

      F                                                                                           

      Test: KETONE, URINE AUTO; Value: NEGATIVE; Range: NEGATIVE; Units: mg/dL; Status: F         

      Test: UROBILINOGEN, URINE AUTO; Value: 0.2; Range: 0.0-2.0; Units: mg/dL; Status: F         

      Test: BILIRUBIN, URINE AUTO; Value: NEGATIVE; Range: NEGATIVE; Status: F                    

      Test: NITRITE, URINE AUTO; Value: NEGATIVE; Range: NEGATIVE; Status: F                      

      Test: LEUKOCYTE ESTERASE, URINE AUTO; Value: TRACE; Range: NEGATIVE; Abnormal: Above        

      high normal; Status: F                                                                      

      Test: BLOOD, URINE BLOOD; Value: 3+; Range: NEGATIVE; Abnormal: Above high normal;          

      Status: F                                                                                   

      Test: WBC, URINE AUTO; Value: 13; Range: 0-3; Abnormal: Above high normal; Units: /HPF;     

      Status: F                                                                                   

      Test: RBC, URINE AUTO; Value: TNTC; Range: 0-3; Abnormal: Above high normal; Units:         

      /HPF; Status: F                                                                             

      Test: BACTERIA, URINE AUTO; Value: 1+; Range: NEGATIVE; Abnormal: Above high normal;        

      Status: F                                                                                   

      Test: SQUAMOUS EPITHELIAL CELL UR AU; Value: 1; Range: 0-6; Units: /HPF; Status: F          

      Test: MUCUS, URINE; Value: SMALL; Range: NEGATIVE; Status: F                                

      Test: HYALINE CAST, URINE AUTO; Value: 0; Range: 0-1; Units: /LPF; Status: F                

      Test: AMORPHOUS SEDIMENT; Value: SMALL; Range: NEGATIVE; Abnormal: Above high normal;       

      Status: F                                                                                   

Lab Order: Amylase; SPEC'17 20:19                                                         

      Test: AMYLASE; Value: 65; Range: ; Units: U/L; Status: F                              

Lab Order: Basic Metabolic Profile; SPEC'17 20:19                                         

      Test: GLUCOSE, FASTING; Value: 87; Range: ; Units: MG/DL; Status: F                   

      Test: BLOOD UREA NITROGEN; Value: 8; Range: 7-18; Units: MG/DL; Status: F                   

      Test: CREATININE FOR GFR; Value: 0.92; Range: 0.55-1.02; Units: MG/DL; Status: F            

      Test: GLOMERULAR FILTRATION RATE; Value: > 60.0; Range: >60; Status: F                      

      Test: SODIUM LEVEL; Value: 142; Range: 136-145; Units: MEQ/L; Status: F                     

      Test: POTASSIUM SERUM; Value: 3.8; Range: 3.5-5.1; Units: MEQ/L; Status: F                  

      Test: CHLORIDE LEVEL; Value: 106; Range: ; Units: MEQ/L; Status: F                    

      Test: CARBON DIOXIDE LEVEL; Value: 28; Range: 21-32; Units: MEQ/L; Status: F                

      Test: ANION GAP; Value: 8; Range: 8-16; Units: MEQ/L; Status: F                             

      Test: CALCIUM LEVEL; Value: 9.0; Range: 8.5-10.1; Units: MG/DL; Status: F                   

      Test Note: &nbsp;; Units are mL/min/1.73 m2 Chronic Kidney Disease Staging per NKF:       

      Stage I & II GFR >=60 Normal to Mildly Decreased Stage III GFR 30-59 Moderately           

      Decreased Stage IV GFR 15-29 Severely Decreased Stage V GFR <15 Very Little GFR Left        

      ESRD GFR <15 on RRT                                                                         

Lab Order: CBC with Diff; SPEC'17 20:19                                                   

      Test: WHITE BLOOD COUNT; Value: 8.6; Range: 4.0-10.0; Units: K/mm3; Status: F               

      Test: RED BLOOD COUNT; Value: 4.67; Range: 4.00-5.40; Units: M/mm3; Status: F               

      Test: HEMOGLOBIN; Value: 13.8; Range: 12.0-16.0; Units: g/dl; Status: F                     

      Test: HEMATOCRIT; Value: 42.3; Range: 36.0-47.0; Units: %; Status: F                        

      Test: MEAN CORPUSCULAR VOLUME; Value: 90.5; Range: 80.0-96.0; Units: fl; Status: F          

      Test: MEAN CORPUSCULAR HEMOGLOBIN; Value: 29.6; Range: 27.0-33.0; Units: pg; Status: F      

      Test: MEAN CORPUSCULAR HGB CONC; Value: 32.8; Range: 32.0-36.5; Units: g/dl; Status: F      

      Test: RED CELL DISTRIBUTION WIDTH; Value: 13.2; Range: 11.5-14.5; Units: %; Status: F       

      Test: PLATELET COUNT, AUTOMATED; Value: 208; Range: 150-450; Units: k/mm3; Status: F        

      Test: NEUTROPHILS %; Value: 61.4; Range: 36.0-66.0; Units: %; Status: F                     

      Test: LYMPH %; Value: 28.8; Range: 24.0-44.0; Units: %; Status: F                           

      Test: MONO %; Value: 5.1; Range: 0.0-5.0; Abnormal: Above high normal; Units: %;            

      Status: F                                                                                   

      Test: EOS %; Value: 1.0; Range: 0.0-3.0; Units: %; Status: F                                

      Test: BASO %; Value: 0.5; Range: 0.0-1.0; Units: %; Status: F                               

      Test: LARGE UNSTAINED CELL %; Value: 3.0; Range: 0.0-4.0; Units: %; Status: F               

      Test: NEUTROPHILS #; Value: 5.3; Range: 1.8-7.7; Units: K/mm3; Status: F                    

      Test: LYMPH #; Value: 2.5; Range: 1.5-6.5; Units: K/mm3; Status: F                          

      Test: MONO #; Value: 0.4; Range: 0.0-0.8; Units: K/mm3; Status: F                           

      Test: EOS #; Value: 0.1; Range: 0.0-0.50; Units: K/mm3; Status: F                           

      Test: BASO #; Value: 0.0; Range: 0.0-0.2; Units: K/mm3; Status: F                           

      Test: LARGE UNSTAINED CELL #; Value: 0.3; Range: 0.0-0.4; Units: K/mm3; Status: F           

Lab Order: Lipase; SPEC' 17 20:19                                                          

      Test: LIPASE; Value: 96; Range: ; Units: U/L; Status: F                               

Lab Order: Liver Profile; SPEC' 17 20:19                                                   

      Test: AST/SGOT; Value: 15; Range: 15-37; Units: U/L; Status: F                              

      Test: ALT/SGPT; Value: 20; Range: 12-78; Units: U/L; Status: F                              

      Test: ALKALINE PHOSPHATASE; Value: 66; Range: ; Units: U/L; Status: F                 

      Test: BILIRUBIN,TOTAL; Value: 0.2; Range: 0.2-1.0; Units: MG/DL; Status: F                  

      Test: BILIRUBIN,DIRECT; Value: < 0.1; Range: 0.0-0.2; Units: MG/DL; Status: F               

      Test: TOTAL PROTEIN; Value: 6.8; Range: 6.4-8.2; Units: GM/DL; Status: F                    

      Test: ALBUMIN; Value: 3.5; Range: 3.2-5.2; Units: GM/DL; Status: F                          

      Test: ALBUMIN/GLOBULIN RATIO; Value: 1.06; Range: 1.00-1.93; Status: F                      

                                                                                                  

Radiology Order: CT ABD & PELVIS: No Contrast                                                   

      Test: CT ABD & PELVIS: No Contrast                                                        

      REASON FOR EXAMINATION: right renal colic; ; CT of the abdomen and pelvis without           

      contrast; Clinical statement: Pain.; Technique: Multiple axial CT images were obtained      

      from the base of the lungs to the floor of the pelv; is utilizing 5 mm axial slices         

      without administration of contrast. Coronal and sagittal reconstructio; ns were also        

      obtained.; No comparison is available.; Findings:; Chest: The visualized lung bases are     

      clear.; Abdomen: The kidneys are normal in size bilaterally. There is moderate              

      right-sided hydronephrosis cau; sed by a 4 mm obstructing stone at the right                

      ureteropelvic junction. The liver, spleen, pancreas, gal; lbladder and adrenal glands       

      are unremarkable. The aorta demonstrates normal caliber and contour. Ther; e is no          

      abdominal lymphadenopathy or ascites.; Pelvis: The bowel is unremarkable, with no           

      obstructive or inflammatory changes. The appendix is simone; l. The urinary bladder is       

      within normal limits. There is no pelvic lymphadenopathy or ascites. The ot; her pelvic     

      structures appear unremarkable.; Bones: There are no suspicious osseous abnormalities       

      seen.; Impression: Mild right-sided hydronephrosis caused by a 4 mm obstructing stone       

      at the right ureterope; lvic junction.; Electronically signed by JODI CAMARILLO MD on     

      2017 10:05:26 PM ET;                                                                  

Outcome:                                                                                          

22:44 Discharge ordered by Provider.                                                          ar2 

23:05 Discharge Assessment: Patient awake, alert and oriented x 3. No cognitive and/or        ld5 

      functional deficits noted. Patient verbalized understanding of disposition                  

      instructions. patient administered narcotics - no. The following High Risk Discharge        

      criteria are identified: None. Discharged to home ambulatory, with friend. Condition:       

      stable. Discharge instructions given to patient, Instructed on discharge instructions,      

      follow up and referral plans. medication usage, no driving heavy equipment,                 

      Demonstrated understanding of instructions, medications, Pt was receptive of discharge      

      instructions/ teaching. Prescriptions given X 4. CT Study completed. Property :Personal     

      belongings accompany Pt.                                                                    

23:07 Patient left the ED.                                                                    ld5 

                                                                                                  

Addendum:                                                                                         

2017                                                                                        

     10:39 Narrative: Urine culture results reviewed with Dr. Griffin and Rx written for Keflex    
kcs

           500 mg po QID x 7 days. Message left for patient to call us concerning where she would 

           like Rx called to.                                                                     

     11:33 Narrative: Patient called back and requests [facility] in [place] - Rx called in.      
kcs

                                                                                                  

Signatures:                                                                                       

Dispatcher [employer]                           EDStacy Salguero RN                      RN   Juan C Toth RN RN   cz                                                   

Shahla Qureshi, Babar                  Reg  Alvino Ferrara Aaron, PA-C                 PA-PROMISE ar2                                                  

Mayen,Itzel,RN                      RN   ld5                                                  

Anuradha Whipple,RN                          RN   cj                                                  

Kavon Scott                            gr2                                                  

Simone, Joan, PCA                   PCA  cln                                                  

Anuradha Whipple RN                               [facility]                                                  

                                                                                                  

**************************************************************************************************



*** Chart Complete ***
MTDD